# Patient Record
Sex: FEMALE | Race: WHITE | NOT HISPANIC OR LATINO | Employment: OTHER | URBAN - NONMETROPOLITAN AREA
[De-identification: names, ages, dates, MRNs, and addresses within clinical notes are randomized per-mention and may not be internally consistent; named-entity substitution may affect disease eponyms.]

---

## 2018-11-20 ENCOUNTER — HISTORICAL (OUTPATIENT)
Dept: ADMINISTRATIVE | Facility: HOSPITAL | Age: 83
End: 2018-11-20

## 2019-05-14 ENCOUNTER — HISTORICAL (OUTPATIENT)
Dept: ADMINISTRATIVE | Facility: HOSPITAL | Age: 84
End: 2019-05-14

## 2019-08-12 ENCOUNTER — HISTORICAL (OUTPATIENT)
Dept: ADMINISTRATIVE | Facility: HOSPITAL | Age: 84
End: 2019-08-12

## 2021-02-02 LAB
ALBUMIN SERPL-MCNC: 3.7 G/DL (ref 3.4–5)
ALBUMIN/GLOB SERPL: 1.3 {RATIO}
ALP SERPL-CCNC: 87 U/L (ref 50–144)
ALT SERPL-CCNC: 7 U/L (ref 1–45)
ANION GAP SERPL CALC-SCNC: 3 MMOL/L (ref 7–16)
AST SERPL-CCNC: 17 U/L (ref 14–36)
BASOPHILS # BLD AUTO: 0.05 X10(3)/MCL (ref 0.01–0.08)
BASOPHILS NFR BLD AUTO: 0.7 % (ref 0.1–1.2)
BILIRUB SERPL-MCNC: 0.44 MG/DL (ref 0.1–1)
BUN SERPL-MCNC: 21 MG/DL (ref 7–20)
CALCIUM SERPL-MCNC: 9.2 MG/DL (ref 8.4–10.2)
CHLORIDE SERPL-SCNC: 97 MMOL/L (ref 94–110)
CHOLEST SERPL-MCNC: 154 MG/DL (ref 0–200)
CO2 SERPL-SCNC: 40 MMOL/L (ref 21–32)
CREAT SERPL-MCNC: 1.1 MG/DL (ref 0.52–1.04)
CREAT/UREA NIT SERPL: 19.1 (ref 12–20)
DEPRECATED CALCIDIOL+CALCIFEROL SERPL-MC: 28.7 NG/ML (ref 30–100)
EOSINOPHIL # BLD AUTO: 0.3 X10(3)/MCL (ref 0.04–0.36)
EOSINOPHIL NFR BLD AUTO: 4.2 % (ref 0.7–7)
ERYTHROCYTE [DISTWIDTH] IN BLOOD BY AUTOMATED COUNT: 14.9 % (ref 11–14.5)
EST. AVERAGE GLUCOSE BLD GHB EST-MCNC: 123 MG/DL (ref 70–115)
FOLATE SERPL-MCNC: 11 NG/ML (ref 2.76–20)
GLOBULIN SER-MCNC: 2.8 G/DL (ref 2–3.9)
GLUCOSE SERPL-MCNC: 99 MGM./DL (ref 70–115)
HBA1C MFR BLD: 6.1 % (ref 4–6)
HCT VFR BLD AUTO: 49.9 % (ref 36–48)
HDLC SERPL-MCNC: 39 MG/DL (ref 40–60)
HGB BLD-MCNC: 14.8 G/DL (ref 11.8–16)
IMM GRANULOCYTES # BLD AUTO: 0.01 X10E3/UL (ref 0–0.03)
IMM GRANULOCYTES NFR BLD AUTO: 0.1 % (ref 0–0.5)
IRON SERPL-MCNC: 75 MCG/DL (ref 37–170)
LDLC SERPL CALC-MCNC: 88 MG/DL (ref 30–100)
LYMPHOCYTES # BLD AUTO: 1.31 X10(3)/MCL (ref 1.16–3.74)
LYMPHOCYTES NFR BLD AUTO: 18.5 % (ref 20–55)
MCH RBC QN AUTO: 28.6 PG (ref 27–34)
MCHC RBC AUTO-ENTMCNC: 29.7 G/DL (ref 31–37)
MCV RBC AUTO: 96.5 FL (ref 79–99)
MONOCYTES # BLD AUTO: 0.74 X10(3)/MCL (ref 0.24–0.36)
MONOCYTES NFR BLD AUTO: 10.4 % (ref 4.7–12.5)
NEUTROPHILS # BLD AUTO: 4.69 X10(3)/MCL (ref 1.56–6.13)
NEUTROPHILS NFR BLD AUTO: 66.1 % (ref 37–73)
PLATELET # BLD AUTO: 399 X10(3)/MCL (ref 140–371)
PMV BLD AUTO: 11.7 FL (ref 9.4–12.4)
POTASSIUM SERPL-SCNC: 5.2 MMOL/L (ref 3.5–5.1)
PROT SERPL-MCNC: 6.5 G/DL (ref 6.3–8.2)
RBC # BLD AUTO: 5.17 X10(6)/MCL (ref 4–5.1)
SODIUM SERPL-SCNC: 140 MMOL/L (ref 135–145)
TRIGL SERPL-MCNC: 155 MG/DL (ref 30–200)
TSH SERPL-ACNC: 2.19 UIU/ML (ref 0.36–3.74)
VIT B12 SERPL-MCNC: 735 PG/ML (ref 211–946)
WBC # SPEC AUTO: 7.1 X10(3)/MCL (ref 4–11.5)

## 2021-06-10 LAB
BASOPHILS # BLD AUTO: 0.04 X10(3)/MCL (ref 0.01–0.08)
BASOPHILS NFR BLD AUTO: 0.5 % (ref 0.1–1.2)
CHOLEST SERPL-MCNC: 147 MG/DL (ref 0–200)
DEPRECATED CALCIDIOL+CALCIFEROL SERPL-MC: 40.4 NG/ML (ref 30–100)
EOSINOPHIL # BLD AUTO: 0.27 X10(3)/MCL (ref 0.04–0.36)
EOSINOPHIL NFR BLD AUTO: 3.6 % (ref 0.7–7)
ERYTHROCYTE [DISTWIDTH] IN BLOOD BY AUTOMATED COUNT: 15.9 % (ref 11–14.5)
ERYTHROCYTE [SEDIMENTATION RATE] IN BLOOD: 16 MM/HR (ref 0–20)
EST. AVERAGE GLUCOSE BLD GHB EST-MCNC: 132 MG/DL (ref 70–115)
HBA1C MFR BLD: 6.4 % (ref 4–6)
HCT VFR BLD AUTO: 50.3 % (ref 36–48)
HDLC SERPL-MCNC: 42 MG/DL (ref 40–60)
HGB BLD-MCNC: 14.6 G/DL (ref 11.8–16)
IMM GRANULOCYTES # BLD AUTO: 0.02 X10E3/UL (ref 0–0.03)
IMM GRANULOCYTES NFR BLD AUTO: 0.3 % (ref 0–0.5)
LDLC SERPL CALC-MCNC: 73.2 MG/DL (ref 30–100)
LYMPHOCYTES # BLD AUTO: 1.03 X10(3)/MCL (ref 1.16–3.74)
LYMPHOCYTES NFR BLD AUTO: 13.8 % (ref 20–55)
MCH RBC QN AUTO: 27.2 PG (ref 27–34)
MCHC RBC AUTO-ENTMCNC: 29 G/DL (ref 31–37)
MCV RBC AUTO: 93.8 FL (ref 79–99)
MONOCYTES # BLD AUTO: 0.69 X10(3)/MCL (ref 0.24–0.36)
MONOCYTES NFR BLD AUTO: 9.2 % (ref 4.7–12.5)
NEUTROPHILS # BLD AUTO: 5.43 X10(3)/MCL (ref 1.56–6.13)
NEUTROPHILS NFR BLD AUTO: 72.6 % (ref 37–73)
PLATELET # BLD AUTO: 374 X10(3)/MCL (ref 140–371)
PMV BLD AUTO: 11.9 FL (ref 9.4–12.4)
RBC # BLD AUTO: 5.36 X10(6)/MCL (ref 4–5.1)
TRIGL SERPL-MCNC: 138 MG/DL (ref 30–200)
TSH SERPL-ACNC: 7.05 UIU/ML (ref 0.36–3.74)
VIT B12 SERPL-MCNC: 490 PG/ML (ref 211–946)
WBC # SPEC AUTO: 7.5 X10(3)/MCL (ref 4–11.5)

## 2021-09-08 LAB
ALBUMIN SERPL-MCNC: 3.7 G/DL (ref 3.4–5)
ALBUMIN/GLOB SERPL: 1.2 {RATIO}
ALP SERPL-CCNC: 83 U/L (ref 50–144)
ALT SERPL-CCNC: 8 U/L (ref 1–45)
ANION GAP SERPL CALC-SCNC: 6 MMOL/L (ref 7–16)
AST SERPL-CCNC: 19 U/L (ref 14–36)
BASOPHILS # BLD AUTO: 0.07 X10(3)/MCL (ref 0.01–0.08)
BASOPHILS NFR BLD AUTO: 1 % (ref 0.1–1.2)
BILIRUB SERPL-MCNC: 0.38 MG/DL (ref 0.1–1)
BUN SERPL-MCNC: 21 MG/DL (ref 7–20)
CALCIUM SERPL-MCNC: 9.3 MG/DL (ref 8.4–10.2)
CHLORIDE SERPL-SCNC: 98 MMOL/L (ref 94–110)
CHOLEST SERPL-MCNC: 151 MG/DL (ref 0–200)
CO2 SERPL-SCNC: 35 MMOL/L (ref 21–32)
CREAT SERPL-MCNC: 1 MG/DL (ref 0.52–1.04)
CREAT/UREA NIT SERPL: 21 (ref 12–20)
DEPRECATED CALCIDIOL+CALCIFEROL SERPL-MC: 50.3 NG/ML (ref 30–100)
EOSINOPHIL # BLD AUTO: 0.26 X10(3)/MCL (ref 0.04–0.36)
EOSINOPHIL NFR BLD AUTO: 3.7 % (ref 0.7–7)
ERYTHROCYTE [DISTWIDTH] IN BLOOD BY AUTOMATED COUNT: 16.4 % (ref 11–14.5)
EST. AVERAGE GLUCOSE BLD GHB EST-MCNC: 126 MG/DL (ref 70–115)
GLOBULIN SER-MCNC: 3.1 G/DL (ref 2–3.9)
GLUCOSE SERPL-MCNC: 103 MGM./DL (ref 70–115)
HBA1C MFR BLD: 6.2 % (ref 4–6)
HCT VFR BLD AUTO: 47.7 % (ref 36–48)
HDLC SERPL-MCNC: 43 MG/DL (ref 40–60)
HGB BLD-MCNC: 14.2 G/DL (ref 11.8–16)
IMM GRANULOCYTES # BLD AUTO: 0.02 X10E3/UL (ref 0–0.03)
IMM GRANULOCYTES NFR BLD AUTO: 0.3 % (ref 0–0.5)
IRON SERPL-MCNC: 42 MCG/DL (ref 37–170)
LDLC SERPL CALC-MCNC: 72.8 MG/DL (ref 30–100)
LYMPHOCYTES # BLD AUTO: 1.2 X10(3)/MCL (ref 1.16–3.74)
LYMPHOCYTES NFR BLD AUTO: 17 % (ref 20–55)
MCH RBC QN AUTO: 26.6 PG (ref 27–34)
MCHC RBC AUTO-ENTMCNC: 29.8 G/DL (ref 31–37)
MCV RBC AUTO: 89.5 FL (ref 79–99)
MONOCYTES # BLD AUTO: 0.62 X10(3)/MCL (ref 0.24–0.36)
MONOCYTES NFR BLD AUTO: 8.8 % (ref 4.7–12.5)
NEUTROPHILS # BLD AUTO: 4.87 X10(3)/MCL (ref 1.56–6.13)
NEUTROPHILS NFR BLD AUTO: 69.2 % (ref 37–73)
PLATELET # BLD AUTO: 462 X10(3)/MCL (ref 140–371)
PMV BLD AUTO: 11.4 FL (ref 9.4–12.4)
POTASSIUM SERPL-SCNC: 5 MMOL/L (ref 3.5–5.1)
PROT SERPL-MCNC: 6.8 G/DL (ref 6.3–8.2)
RBC # BLD AUTO: 5.33 X10(6)/MCL (ref 4–5.1)
SODIUM SERPL-SCNC: 139 MMOL/L (ref 135–145)
TRIGL SERPL-MCNC: 129 MG/DL (ref 30–200)
TSH SERPL-ACNC: 0.21 UIU/ML (ref 0.36–3.74)
VIT B12 SERPL-MCNC: 465 PG/ML (ref 211–946)
WBC # SPEC AUTO: 7 X10(3)/MCL (ref 4–11.5)

## 2021-12-21 LAB
ANION GAP SERPL CALC-SCNC: 4 MMOL/L (ref 2–13)
BASOPHILS # BLD AUTO: 0.1 10*3/UL (ref 0.01–0.08)
BASOPHILS NFR BLD AUTO: 1.3 % (ref 0.1–1.2)
BUN SERPL-MCNC: 17 MG/DL (ref 7–20)
CALCIUM SERPL-MCNC: 9.1 MG/DL (ref 8.4–10.2)
CHLORIDE SERPL-SCNC: 101 MMOL/L (ref 94–110)
CO2 SERPL-SCNC: 36 MMOL/L (ref 21–32)
CREAT SERPL-MCNC: 0.98 MG/DL (ref 0.52–1.04)
CREAT/UREA NIT SERPL: 17.3 (ref 12–20)
EOSINOPHIL # BLD AUTO: 0.27 10*3/UL (ref 0.04–0.36)
EOSINOPHIL NFR BLD AUTO: 3.5 % (ref 0.7–7)
ERYTHROCYTE [DISTWIDTH] IN BLOOD BY AUTOMATED COUNT: 16.8 % (ref 11–14.5)
GLUCOSE SERPL-MCNC: 90 MGM./DL (ref 70–115)
HCT VFR BLD AUTO: 45.8 % (ref 36–48)
HGB BLD-MCNC: 13.9 G/DL (ref 11.8–16)
IMM GRANULOCYTES # BLD AUTO: 0.01 10*3/UL (ref 0–0.03)
IMM GRANULOCYTES NFR BLD AUTO: 0.1 % (ref 0–0.5)
IRON SERPL-MCNC: 43 MCG/DL (ref 37–170)
LYMPHOCYTES # BLD AUTO: 1.43 10*3/UL (ref 1.16–3.74)
LYMPHOCYTES NFR BLD AUTO: 18.6 % (ref 20–55)
MCH RBC QN AUTO: 26.4 PG (ref 27–34)
MCHC RBC AUTO-ENTMCNC: 30.3 G/DL (ref 31–37)
MCV RBC AUTO: 86.9 FL (ref 79–99)
MONOCYTES # BLD AUTO: 0.76 10*3/UL (ref 0.24–0.36)
MONOCYTES NFR BLD AUTO: 9.9 % (ref 4.7–12.5)
NEUTROPHILS # BLD AUTO: 5.1 10*3/UL (ref 1.56–6.13)
NEUTROPHILS NFR BLD AUTO: 66.6 % (ref 37–73)
PLATELET # BLD AUTO: 518 10*3/UL (ref 140–371)
PMV BLD AUTO: 11.2 FL (ref 9.4–12.4)
POTASSIUM SERPL-SCNC: 5.1 MMOL/L (ref 3.5–5.1)
RBC # BLD AUTO: 5.27 10*6/UL (ref 4–5.1)
SODIUM SERPL-SCNC: 141 MMOL/L (ref 135–145)
TSH SERPL-ACNC: 0.35 UIU/ML (ref 0.36–3.74)
WBC # SPEC AUTO: 7.7 10*3/UL (ref 4–11.5)

## 2022-01-01 ENCOUNTER — OUTSIDE PLACE OF SERVICE (OUTPATIENT)
Dept: PULMONOLOGY | Facility: CLINIC | Age: 87
End: 2022-01-01
Payer: MEDICARE

## 2022-01-01 ENCOUNTER — HISTORICAL (OUTPATIENT)
Dept: ADMINISTRATIVE | Facility: HOSPITAL | Age: 87
End: 2022-01-01

## 2022-01-01 ENCOUNTER — HISTORICAL (OUTPATIENT)
Dept: ADMINISTRATIVE | Facility: HOSPITAL | Age: 87
End: 2022-01-01
Payer: MEDICARE

## 2022-01-01 ENCOUNTER — LAB REQUISITION (OUTPATIENT)
Dept: LAB | Facility: HOSPITAL | Age: 87
End: 2022-01-01
Payer: MEDICARE

## 2022-01-01 VITALS
HEIGHT: 64 IN | SYSTOLIC BLOOD PRESSURE: 114 MMHG | BODY MASS INDEX: 34.31 KG/M2 | WEIGHT: 201 LBS | DIASTOLIC BLOOD PRESSURE: 68 MMHG | OXYGEN SATURATION: 88 %

## 2022-01-01 DIAGNOSIS — R79.89 OTHER SPECIFIED ABNORMAL FINDINGS OF BLOOD CHEMISTRY: ICD-10-CM

## 2022-01-01 DIAGNOSIS — D52.9 FOLATE DEFICIENCY ANEMIA, UNSPECIFIED: ICD-10-CM

## 2022-01-01 DIAGNOSIS — E03.9 HYPOTHYROIDISM, UNSPECIFIED: ICD-10-CM

## 2022-01-01 DIAGNOSIS — R68.89 OTHER GENERAL SYMPTOMS AND SIGNS: ICD-10-CM

## 2022-01-01 DIAGNOSIS — E55.9 VITAMIN D DEFICIENCY, UNSPECIFIED: ICD-10-CM

## 2022-01-01 LAB
AGE: 85
ALBUMIN SERPL-MCNC: 2.2 G/DL (ref 3.4–4.8)
ALBUMIN SERPL-MCNC: 3.8 G/DL (ref 3.4–5)
ALBUMIN/GLOB SERPL: 1 RATIO (ref 1.1–2)
ALBUMIN/GLOB SERPL: 1.5 {RATIO}
ALP SERPL-CCNC: 197 UNIT/L (ref 40–150)
ALP SERPL-CCNC: 76 U/L (ref 50–144)
ALT SERPL-CCNC: 45 UNIT/L (ref 0–55)
ALT SERPL-CCNC: 9 U/L (ref 1–45)
ANION GAP SERPL CALC-SCNC: 3 MMOL/L (ref 2–13)
ANISOCYTOSIS BLD QL SMEAR: ABNORMAL
AST SERPL-CCNC: 21 U/L (ref 14–36)
AST SERPL-CCNC: 58 UNIT/L (ref 5–34)
BASOPHILS # BLD AUTO: 0.05 X10(3)/MCL (ref 0–0.2)
BASOPHILS # BLD AUTO: 0.12 10*3/UL (ref 0.01–0.08)
BASOPHILS NFR BLD AUTO: 0.3 %
BASOPHILS NFR BLD AUTO: 1.3 % (ref 0.1–1.2)
BILIRUB SERPL-MCNC: 0.46 MG/DL (ref 0–1)
BILIRUBIN DIRECT+TOT PNL SERPL-MCNC: 0.6 MG/DL
BNP BLD-MCNC: 1013.8 PG/ML
BUN SERPL-MCNC: 23 MG/DL (ref 7–20)
BUN SERPL-MCNC: 41 MG/DL (ref 9.8–20.1)
CALCIUM SERPL-MCNC: 8.1 MG/DL (ref 8.4–10.2)
CALCIUM SERPL-MCNC: 9.6 MG/DL (ref 8.4–10.2)
CHLORIDE SERPL-SCNC: 102 MMOL/L (ref 94–110)
CHLORIDE SERPL-SCNC: 95 MMOL/L (ref 98–107)
CHOLEST SERPL-MCNC: 114 MG/DL
CHOLEST SERPL-MCNC: 150 MG/DL (ref 0–200)
CHOLEST/HDLC SERPL: 3 {RATIO} (ref 0–5)
CO2 SERPL-SCNC: 33 MMOL/L (ref 23–31)
CO2 SERPL-SCNC: 36 MMOL/L (ref 21–32)
CREAT SERPL-MCNC: 1.09 MG/DL (ref 0.52–1.04)
CREAT SERPL-MCNC: 1.52 MG/DL (ref 0.55–1.02)
CREAT/UREA NIT SERPL: 21.1 (ref 12–20)
DEPRECATED CALCIDIOL+CALCIFEROL SERPL-MC: 30 NG/ML (ref 30–80)
DEPRECATED CALCIDIOL+CALCIFEROL SERPL-MC: 44.9 NG/ML (ref 30–100)
ELLIPTOCYTOSIS (OHS): SLIGHT
EOSINOPHIL # BLD AUTO: 0.12 X10(3)/MCL (ref 0–0.9)
EOSINOPHIL # BLD AUTO: 0.36 10*3/UL (ref 0.04–0.36)
EOSINOPHIL NFR BLD AUTO: 0.6 %
EOSINOPHIL NFR BLD AUTO: 3.9 % (ref 0.7–7)
ERYTHROCYTE [DISTWIDTH] IN BLOOD BY AUTOMATED COUNT: 16.3 % (ref 11–14.5)
ERYTHROCYTE [DISTWIDTH] IN BLOOD BY AUTOMATED COUNT: 30.6 % (ref 11–14.5)
EST. AVERAGE GLUCOSE BLD GHB EST-MCNC: 126 MG/DL (ref 70–115)
EST. AVERAGE GLUCOSE BLD GHB EST-MCNC: 88.2 MG/DL
FOLATE SERPL-MCNC: 8.3 NG/ML (ref 7–31.4)
GFR SERPLBLD CREATININE-BSD FMLA CKD-EPI: 33 MLS/MIN/1.73/M2
GLOBULIN SER-MCNC: 2.3 GM/DL (ref 2.4–3.5)
GLOBULIN SER-MCNC: 2.6 G/DL (ref 2–3.9)
GLUCOSE SERPL-MCNC: 86 MG/DL (ref 70–115)
GLUCOSE SERPL-MCNC: 91 MG/DL (ref 82–115)
HBA1C MFR BLD: 4.7 %
HBA1C MFR BLD: 6.2 % (ref 4–6)
HCT VFR BLD AUTO: 30.8 % (ref 37–47)
HCT VFR BLD AUTO: 46 % (ref 36–48)
HDLC SERPL-MCNC: 44 MG/DL (ref 40–60)
HDLC SERPL-MCNC: 45 MG/DL (ref 35–60)
HGB BLD-MCNC: 14 G/DL (ref 11.8–16)
HGB BLD-MCNC: 8.7 GM/DL (ref 12–16)
HYPOCHROMIA BLD QL SMEAR: SLIGHT
IMM GRANULOCYTES # BLD AUTO: 0.02 10*3/UL (ref 0–0.03)
IMM GRANULOCYTES # BLD AUTO: 0.11 X10(3)/MCL (ref 0–0.04)
IMM GRANULOCYTES NFR BLD AUTO: 0.2 % (ref 0–0.5)
IMM GRANULOCYTES NFR BLD AUTO: 0.6 %
IRON SERPL-MCNC: 38 UG/DL (ref 37–170)
LDLC SERPL CALC-MCNC: 55 MG/DL (ref 50–140)
LDLC SERPL CALC-MCNC: 74.5 MG/DL (ref 30–100)
LYMPHOCYTES # BLD AUTO: 0.56 X10(3)/MCL (ref 0.6–4.6)
LYMPHOCYTES # BLD AUTO: 1.68 10*3/UL (ref 1.16–3.74)
LYMPHOCYTES NFR BLD AUTO: 18.1 % (ref 20–55)
LYMPHOCYTES NFR BLD AUTO: 2.8 %
MANUAL DIFF? (OHS): NORMAL
MCH RBC QN AUTO: 25.4 PG
MCH RBC QN AUTO: 26.4 PG (ref 27–34)
MCHC RBC AUTO-ENTMCNC: 28.2 MG/DL (ref 33–36)
MCHC RBC AUTO-ENTMCNC: 30.4 G/DL (ref 31–37)
MCV RBC AUTO: 86.6 FL (ref 79–99)
MCV RBC AUTO: 90.1 FL (ref 80–94)
MONOCYTES # BLD AUTO: 0.94 10*3/UL (ref 0.24–0.36)
MONOCYTES # BLD AUTO: 1.58 X10(3)/MCL (ref 0.1–1.3)
MONOCYTES NFR BLD AUTO: 10.1 % (ref 4.7–12.5)
MONOCYTES NFR BLD AUTO: 7.9 %
NEUTROPHILS # BLD AUTO: 17.54 X10(3)/MCL (ref 2.1–9.2)
NEUTROPHILS # BLD AUTO: 6.16 10*3/UL (ref 1.56–6.13)
NEUTROPHILS NFR BLD AUTO: 66.4 % (ref 37–73)
NEUTROPHILS NFR BLD AUTO: 87.8 %
PLATELET # BLD AUTO: 469 X10(3)/MCL (ref 140–371)
PLATELET # BLD AUTO: 596 10*3/UL (ref 140–371)
PLATELET # BLD EST: ABNORMAL 10*3/UL
PMV BLD AUTO: 10.9 FL (ref 9.4–12.4)
PMV BLD AUTO: 11.3 FL (ref 9.4–12.4)
POIKILOCYTOSIS BLD QL SMEAR: SLIGHT
POTASSIUM SERPL-SCNC: 4.7 MMOL/L (ref 3.5–5.1)
POTASSIUM SERPL-SCNC: 5.4 MMOL/L (ref 3.5–5.1)
PROT SERPL-MCNC: 4.5 GM/DL (ref 5.8–7.6)
PROT SERPL-MCNC: 6.4 G/DL (ref 6.3–8.2)
RBC # BLD AUTO: 3.42 X10(6)/MCL (ref 4.2–5.4)
RBC # BLD AUTO: 5.31 10*6/UL (ref 4–5.1)
RBC MORPH BLD: ABNORMAL
SODIUM SERPL-SCNC: 136 MMOL/L (ref 136–145)
SODIUM SERPL-SCNC: 141 MMOL/L (ref 135–145)
TRIGL SERPL-MCNC: 119 MG/DL (ref 30–200)
TRIGL SERPL-MCNC: 68 MG/DL (ref 37–140)
TSH SERPL-ACNC: 0.18 M[IU]/L (ref 0.36–3.74)
TSH SERPL-ACNC: 11.05 UIU/ML (ref 0.35–4.94)
VIT B12 SERPL-MCNC: 1035 PG/ML (ref 213–816)
VIT B12 SERPL-MCNC: 432 PG/ML (ref 211–946)
VLDLC SERPL CALC-MCNC: 14 MG/DL
WBC # SPEC AUTO: 20 X10(3)/MCL (ref 4.5–11.5)
WBC # SPEC AUTO: 9.3 10*3/UL (ref 4–11.5)

## 2022-01-01 PROCEDURE — 82607 VITAMIN B-12: CPT | Performed by: INTERNAL MEDICINE

## 2022-01-01 PROCEDURE — 99232 SBSQ HOSP IP/OBS MODERATE 35: CPT | Mod: FS,,, | Performed by: INTERNAL MEDICINE

## 2022-01-01 PROCEDURE — 99232 PR SUBSEQUENT HOSPITAL CARE,LEVL II: ICD-10-PCS | Mod: FS,,, | Performed by: INTERNAL MEDICINE

## 2022-01-01 PROCEDURE — 80053 COMPREHEN METABOLIC PANEL: CPT | Performed by: INTERNAL MEDICINE

## 2022-01-01 PROCEDURE — 80061 LIPID PANEL: CPT | Performed by: INTERNAL MEDICINE

## 2022-01-01 PROCEDURE — 83880 ASSAY OF NATRIURETIC PEPTIDE: CPT | Performed by: INTERNAL MEDICINE

## 2022-01-01 PROCEDURE — 82746 ASSAY OF FOLIC ACID SERUM: CPT | Performed by: INTERNAL MEDICINE

## 2022-01-01 PROCEDURE — 83036 HEMOGLOBIN GLYCOSYLATED A1C: CPT | Performed by: INTERNAL MEDICINE

## 2022-01-01 PROCEDURE — 84443 ASSAY THYROID STIM HORMONE: CPT | Performed by: INTERNAL MEDICINE

## 2022-01-01 PROCEDURE — 82306 VITAMIN D 25 HYDROXY: CPT | Performed by: INTERNAL MEDICINE

## 2022-01-01 PROCEDURE — 85025 COMPLETE CBC W/AUTO DIFF WBC: CPT | Performed by: INTERNAL MEDICINE

## 2023-01-01 ENCOUNTER — HOSPITAL ENCOUNTER (INPATIENT)
Facility: HOSPITAL | Age: 88
LOS: 2 days | Discharge: HOSPICE | DRG: 291 | End: 2023-02-15
Attending: FAMILY MEDICINE | Admitting: FAMILY MEDICINE
Payer: MEDICARE

## 2023-01-01 ENCOUNTER — HOSPITAL ENCOUNTER (INPATIENT)
Facility: HOSPITAL | Age: 88
LOS: 1 days | DRG: 189 | End: 2023-02-17
Attending: FAMILY MEDICINE | Admitting: FAMILY MEDICINE
Payer: MEDICARE

## 2023-01-01 VITALS
DIASTOLIC BLOOD PRESSURE: 45 MMHG | HEIGHT: 64 IN | HEART RATE: 90 BPM | BODY MASS INDEX: 37.81 KG/M2 | TEMPERATURE: 98 F | WEIGHT: 221.5 LBS | SYSTOLIC BLOOD PRESSURE: 84 MMHG | OXYGEN SATURATION: 85 % | RESPIRATION RATE: 22 BRPM

## 2023-01-01 VITALS
TEMPERATURE: 99 F | OXYGEN SATURATION: 81 % | HEART RATE: 61 BPM | HEIGHT: 64 IN | BODY MASS INDEX: 37.56 KG/M2 | WEIGHT: 220 LBS | DIASTOLIC BLOOD PRESSURE: 21 MMHG | SYSTOLIC BLOOD PRESSURE: 46 MMHG

## 2023-01-01 DIAGNOSIS — R06.02 SHORTNESS OF BREATH: ICD-10-CM

## 2023-01-01 DIAGNOSIS — I50.9 CHF (CONGESTIVE HEART FAILURE): ICD-10-CM

## 2023-01-01 DIAGNOSIS — I50.9 ACUTE ON CHRONIC CONGESTIVE HEART FAILURE, UNSPECIFIED HEART FAILURE TYPE: Primary | ICD-10-CM

## 2023-01-01 LAB
ABS NEUT CALC (OHS): 2.87 X10(3)/MCL (ref 2.1–9.2)
ALBUMIN SERPL-MCNC: 2.1 G/DL (ref 3.4–5)
ALBUMIN SERPL-MCNC: 2.3 G/DL (ref 3.4–5)
ALBUMIN SERPL-MCNC: 2.5 G/DL (ref 3.4–5)
ALBUMIN/GLOB SERPL: 0.8 RATIO
ALBUMIN/GLOB SERPL: 0.8 RATIO
ALBUMIN/GLOB SERPL: 0.9 RATIO
ALP SERPL-CCNC: 154 UNIT/L (ref 50–144)
ALP SERPL-CCNC: 163 UNIT/L (ref 50–144)
ALP SERPL-CCNC: 195 UNIT/L (ref 50–144)
ALT SERPL-CCNC: 20 UNIT/L (ref 1–45)
ALT SERPL-CCNC: 28 UNIT/L (ref 1–45)
ALT SERPL-CCNC: 30 UNIT/L (ref 1–45)
ANION GAP SERPL CALC-SCNC: 2 MEQ/L (ref 2–13)
ANION GAP SERPL CALC-SCNC: 4 MEQ/L (ref 2–13)
ANION GAP SERPL CALC-SCNC: 5 MEQ/L (ref 2–13)
ANISOCYTOSIS BLD QL SMEAR: ABNORMAL
AORTIC VALVE CUSP SEPERATION: 1.82 CM
ASCENDING AORTA: 2.06 CM
AST SERPL-CCNC: 40 UNIT/L (ref 14–36)
AST SERPL-CCNC: 58 UNIT/L (ref 14–36)
AST SERPL-CCNC: 63 UNIT/L (ref 14–36)
AV INDEX (PROSTH): 0.51
AV MEAN GRADIENT: 8 MMHG
AV PEAK GRADIENT: 19 MMHG
AV VALVE AREA: 1.15 CM2
AV VELOCITY RATIO: 0.5
BASOPHILS # BLD AUTO: 0.01 X10(3)/MCL (ref 0.01–0.08)
BASOPHILS # BLD AUTO: 0.01 X10(3)/MCL (ref 0.01–0.08)
BASOPHILS NFR BLD AUTO: 0.1 % (ref 0.1–1.2)
BASOPHILS NFR BLD AUTO: 0.2 % (ref 0.1–1.2)
BILIRUBIN DIRECT+TOT PNL SERPL-MCNC: 0.2 MG/DL (ref 0–1)
BILIRUBIN DIRECT+TOT PNL SERPL-MCNC: 0.2 MG/DL (ref 0–1)
BILIRUBIN DIRECT+TOT PNL SERPL-MCNC: 0.3 MG/DL (ref 0–1)
BNP BLD-MCNC: 7720 PG/ML (ref 10–450)
BSA FOR ECHO PROCEDURE: 2.14 M2
BUN SERPL-MCNC: 42 MG/DL (ref 7–20)
BUN SERPL-MCNC: 48 MG/DL (ref 7–20)
BUN SERPL-MCNC: 51 MG/DL (ref 7–20)
CALCIUM SERPL-MCNC: 6.7 MG/DL (ref 8.4–10.2)
CALCIUM SERPL-MCNC: 6.7 MG/DL (ref 8.4–10.2)
CALCIUM SERPL-MCNC: 7.2 MG/DL (ref 8.4–10.2)
CHLORIDE SERPL-SCNC: 101 MMOL/L (ref 98–110)
CHLORIDE SERPL-SCNC: 101 MMOL/L (ref 98–110)
CHLORIDE SERPL-SCNC: 102 MMOL/L (ref 98–110)
CK MB SERPL-MCNC: 0.37 NG/ML (ref 0–3.38)
CK SERPL-CCNC: 34 U/L (ref 30–135)
CO2 SERPL-SCNC: 27 MMOL/L (ref 21–32)
CO2 SERPL-SCNC: 30 MMOL/L (ref 21–32)
CO2 SERPL-SCNC: 31 MMOL/L (ref 21–32)
CREAT SERPL-MCNC: 1.58 MG/DL (ref 0.66–1.25)
CREAT SERPL-MCNC: 1.75 MG/DL (ref 0.66–1.25)
CREAT SERPL-MCNC: 1.78 MG/DL (ref 0.66–1.25)
CREAT/UREA NIT SERPL: 27 (ref 12–20)
CREAT/UREA NIT SERPL: 27 (ref 12–20)
CREAT/UREA NIT SERPL: 29 (ref 12–20)
CV ECHO LV RWT: 0.3 CM
DOP CALC AO PEAK VEL: 2.16 M/S
DOP CALC AO VTI: 52.1 CM
DOP CALC LVOT AREA: 2.2 CM2
DOP CALC LVOT DIAMETER: 1.69 CM
DOP CALC LVOT PEAK VEL: 1.07 M/S
DOP CALC LVOT STROKE VOLUME: 59.86 CM3
DOP CALC MV VTI: 49.6 CM
DOP CALCLVOT PEAK VEL VTI: 26.7 CM
E WAVE DECELERATION TIME: 245 MSEC
E/A RATIO: 0.87
E/E' RATIO: 6.84 M/S
ECHO LV POSTERIOR WALL: 0.8 CM (ref 0.6–1.1)
EJECTION FRACTION: 55 %
ELLIPTOCYTOSIS (OHS): ABNORMAL
EOSINOPHIL # BLD AUTO: 0 X10(3)/MCL (ref 0.04–0.36)
EOSINOPHIL # BLD AUTO: 0.01 X10(3)/MCL (ref 0.04–0.36)
EOSINOPHIL NFR BLD AUTO: 0 % (ref 0.7–7)
EOSINOPHIL NFR BLD AUTO: 0.2 % (ref 0.7–7)
ERYTHROCYTE [DISTWIDTH] IN BLOOD BY AUTOMATED COUNT: 20.1 % (ref 11–14.5)
ERYTHROCYTE [DISTWIDTH] IN BLOOD BY AUTOMATED COUNT: 20.4 % (ref 11–14.5)
ERYTHROCYTE [DISTWIDTH] IN BLOOD BY AUTOMATED COUNT: 20.7 % (ref 11–14.5)
FRACTIONAL SHORTENING: 29 % (ref 28–44)
GFR SERPLBLD CREATININE-BSD FMLA CKD-EPI: 28 MLS/MIN/1.73/M2
GFR SERPLBLD CREATININE-BSD FMLA CKD-EPI: 28 MLS/MIN/1.73/M2
GFR SERPLBLD CREATININE-BSD FMLA CKD-EPI: 32 MLS/MIN/1.73/M2
GLOBULIN SER-MCNC: 2.5 GM/DL (ref 2–3.9)
GLOBULIN SER-MCNC: 2.7 GM/DL (ref 2–3.9)
GLOBULIN SER-MCNC: 3.3 GM/DL (ref 2–3.9)
GLUCOSE SERPL-MCNC: 103 MG/DL (ref 70–115)
GLUCOSE SERPL-MCNC: 156 MG/DL (ref 70–115)
GLUCOSE SERPL-MCNC: 92 MG/DL (ref 70–115)
HCT VFR BLD AUTO: 26.6 % (ref 36–48)
HCT VFR BLD AUTO: 29 % (ref 36–48)
HCT VFR BLD AUTO: 30.6 % (ref 36–48)
HGB BLD-MCNC: 8.6 GM/DL (ref 11.8–16)
HGB BLD-MCNC: 9.5 GM/DL (ref 11.8–16)
HGB BLD-MCNC: 9.6 GM/DL (ref 11.8–16)
IMM GRANULOCYTES # BLD AUTO: 0.04 X10(3)/MCL (ref 0–0.03)
IMM GRANULOCYTES # BLD AUTO: 0.05 X10(3)/MCL (ref 0–0.03)
IMM GRANULOCYTES # BLD AUTO: 0.06 X10(3)/MCL (ref 0–0.03)
IMM GRANULOCYTES NFR BLD AUTO: 0.5 % (ref 0–0.5)
IMM GRANULOCYTES NFR BLD AUTO: 0.8 % (ref 0–0.5)
IMM GRANULOCYTES NFR BLD AUTO: 1.2 % (ref 0–0.5)
INTERVENTRICULAR SEPTUM: 0.8 CM (ref 0.6–1.1)
IVC DIAMETER: 2.86 CM
LEFT ATRIUM SIZE: 4.88 CM
LEFT ATRIUM VOLUME INDEX MOD: 39.3 ML/M2
LEFT ATRIUM VOLUME MOD: 80.5 CM3
LEFT INTERNAL DIMENSION IN SYSTOLE: 3.82 CM (ref 2.1–4)
LEFT VENTRICLE DIASTOLIC VOLUME INDEX: 67.46 ML/M2
LEFT VENTRICLE DIASTOLIC VOLUME: 138.3 ML
LEFT VENTRICLE MASS INDEX: 74 G/M2
LEFT VENTRICLE SYSTOLIC VOLUME INDEX: 30.6 ML/M2
LEFT VENTRICLE SYSTOLIC VOLUME: 62.7 ML
LEFT VENTRICULAR INTERNAL DIMENSION IN DIASTOLE: 5.35 CM (ref 3.5–6)
LEFT VENTRICULAR MASS: 152.5 G
LV LATERAL E/E' RATIO: 7.07 M/S
LV SEPTAL E/E' RATIO: 6.63 M/S
LVOT MG: 2.5 MMHG
LVOT MV: 0.72 CM/S
LYMPHOCYTES # BLD AUTO: 0.71 X10(3)/MCL (ref 1.16–3.74)
LYMPHOCYTES # BLD AUTO: 0.76 X10(3)/MCL (ref 1.16–3.74)
LYMPHOCYTES NFR BLD AUTO: 12.7 % (ref 20–55)
LYMPHOCYTES NFR BLD AUTO: 6.4 % (ref 20–55)
LYMPHOCYTES NFR BLD MANUAL: 0.17 X10(3)/MCL
LYMPHOCYTES NFR BLD MANUAL: 5 % (ref 13–40)
MAGNESIUM SERPL-MCNC: 2 MG/DL (ref 1.8–2.4)
MCH RBC QN AUTO: 29 PG (ref 27–34)
MCH RBC QN AUTO: 29.6 PG (ref 27–34)
MCH RBC QN AUTO: 29.8 PG (ref 27–34)
MCV RBC AUTO: 90.9 FL (ref 79–99)
MCV RBC AUTO: 91.4 FL (ref 79–99)
MCV RBC AUTO: 92.4 FL (ref 79–99)
MEAN CELL HEMOGLOBIN CONCENTRATION (OHS) G/DL: 31.4 G/DL (ref 31–37)
MEAN CELL HEMOGLOBIN CONCENTRATION (OHS) G/DL: 32.3 G/DL (ref 31–37)
MEAN CELL HEMOGLOBIN CONCENTRATION (OHS) G/DL: 32.8 G/DL (ref 31–37)
MONOCYTES # BLD AUTO: 0.65 X10(3)/MCL (ref 0.24–0.36)
MONOCYTES # BLD AUTO: 0.9 X10(3)/MCL (ref 0.24–0.36)
MONOCYTES NFR BLD AUTO: 15 % (ref 4.7–12.5)
MONOCYTES NFR BLD AUTO: 5.9 % (ref 4.7–12.5)
MONOCYTES NFR BLD MANUAL: 0.26 X10(3)/MCL (ref 0.1–1.3)
MONOCYTES NFR BLD MANUAL: 8 % (ref 2–11)
MV MEAN GRADIENT: 3 MMHG
MV PEAK A VEL: 1.22 M/S
MV PEAK E VEL: 1.06 M/S
MV PEAK GRADIENT: 9 MMHG
MV STENOSIS PRESSURE HALF TIME: 93 MS
MV VALVE AREA BY CONTINUITY EQUATION: 1.21 CM2
MV VALVE AREA P 1/2 METHOD: 2.37 CM2
NEUTROPHILS # BLD AUTO: 4.27 X10(3)/MCL (ref 1.56–6.13)
NEUTROPHILS # BLD AUTO: 9.6 X10(3)/MCL (ref 1.56–6.13)
NEUTROPHILS NFR BLD AUTO: 71.1 % (ref 37–73)
NEUTROPHILS NFR BLD AUTO: 87.1 % (ref 37–73)
NEUTROPHILS NFR BLD MANUAL: 87 % (ref 47–80)
NRBC BLD AUTO-RTO: 0 % (ref 0–1)
PISA TR MAX VEL: 2.47 M/S
PLATELET # BLD AUTO: 453 X10(3)/MCL (ref 140–371)
PLATELET # BLD AUTO: 473 X10(3)/MCL (ref 140–371)
PLATELET # BLD AUTO: 634 X10(3)/MCL (ref 140–371)
PLATELET # BLD EST: ABNORMAL 10*3/UL
PMV BLD AUTO: 10.4 FL (ref 9.4–12.4)
PMV BLD AUTO: 10.5 FL (ref 9.4–12.4)
PMV BLD AUTO: 9.6 FL (ref 9.4–12.4)
POIKILOCYTOSIS BLD QL SMEAR: ABNORMAL
POTASSIUM SERPL-SCNC: 4.3 MMOL/L (ref 3.5–5.1)
POTASSIUM SERPL-SCNC: 4.6 MMOL/L (ref 3.5–5.1)
POTASSIUM SERPL-SCNC: 4.6 MMOL/L (ref 3.5–5.1)
PROT SERPL-MCNC: 4.6 GM/DL (ref 6.3–8.2)
PROT SERPL-MCNC: 5 GM/DL (ref 6.3–8.2)
PROT SERPL-MCNC: 5.8 GM/DL (ref 6.3–8.2)
RA PRESSURE: 15 MMHG
RBC # BLD AUTO: 2.91 X10(6)/MCL (ref 4–5.1)
RBC # BLD AUTO: 3.19 X10(6)/MCL (ref 4–5.1)
RBC # BLD AUTO: 3.31 X10(6)/MCL (ref 4–5.1)
RBC MORPH BLD: ABNORMAL
SODIUM SERPL-SCNC: 133 MMOL/L (ref 135–145)
SODIUM SERPL-SCNC: 134 MMOL/L (ref 135–145)
SODIUM SERPL-SCNC: 136 MMOL/L (ref 135–145)
TARGETS BLD QL SMEAR: ABNORMAL
TDI LATERAL: 0.15 M/S
TDI SEPTAL: 0.16 M/S
TDI: 0.16 M/S
TR MAX PG: 24 MMHG
TRICUSPID ANNULAR PLANE SYSTOLIC EXCURSION: 2.78 CM
TROPONIN I SERPL-MCNC: 0.02 NG/ML (ref 0–0.03)
TV REST PULMONARY ARTERY PRESSURE: 39 MMHG
WBC # SPEC AUTO: 11 X10(3)/MCL (ref 4–11.5)
WBC # SPEC AUTO: 3.3 X10(3)/MCL (ref 4–11.5)
WBC # SPEC AUTO: 6 X10(3)/MCL (ref 4–11.5)

## 2023-01-01 PROCEDURE — 94640 AIRWAY INHALATION TREATMENT: CPT

## 2023-01-01 PROCEDURE — 27100171 HC OXYGEN HIGH FLOW UP TO 24 HOURS

## 2023-01-01 PROCEDURE — 63600175 PHARM REV CODE 636 W HCPCS: Performed by: FAMILY MEDICINE

## 2023-01-01 PROCEDURE — 94761 N-INVAS EAR/PLS OXIMETRY MLT: CPT

## 2023-01-01 PROCEDURE — 83880 ASSAY OF NATRIURETIC PEPTIDE: CPT | Performed by: FAMILY MEDICINE

## 2023-01-01 PROCEDURE — 21400001 HC TELEMETRY ROOM

## 2023-01-01 PROCEDURE — 93010 EKG 12-LEAD: ICD-10-PCS | Mod: ,,, | Performed by: INTERNAL MEDICINE

## 2023-01-01 PROCEDURE — 36415 COLL VENOUS BLD VENIPUNCTURE: CPT | Performed by: FAMILY MEDICINE

## 2023-01-01 PROCEDURE — A4216 STERILE WATER/SALINE, 10 ML: HCPCS | Performed by: FAMILY MEDICINE

## 2023-01-01 PROCEDURE — 25000003 PHARM REV CODE 250: Performed by: FAMILY MEDICINE

## 2023-01-01 PROCEDURE — 11000001 HC ACUTE MED/SURG PRIVATE ROOM

## 2023-01-01 PROCEDURE — 83735 ASSAY OF MAGNESIUM: CPT | Performed by: FAMILY MEDICINE

## 2023-01-01 PROCEDURE — 80053 COMPREHEN METABOLIC PANEL: CPT | Performed by: FAMILY MEDICINE

## 2023-01-01 PROCEDURE — 25000242 PHARM REV CODE 250 ALT 637 W/ HCPCS: Performed by: FAMILY MEDICINE

## 2023-01-01 PROCEDURE — 93005 ELECTROCARDIOGRAM TRACING: CPT

## 2023-01-01 PROCEDURE — 94799 UNLISTED PULMONARY SVC/PX: CPT

## 2023-01-01 PROCEDURE — 85025 COMPLETE CBC W/AUTO DIFF WBC: CPT | Performed by: FAMILY MEDICINE

## 2023-01-01 PROCEDURE — 93010 ELECTROCARDIOGRAM REPORT: CPT | Mod: ,,, | Performed by: INTERNAL MEDICINE

## 2023-01-01 PROCEDURE — 99900035 HC TECH TIME PER 15 MIN (STAT)

## 2023-01-01 PROCEDURE — 27000221 HC OXYGEN, UP TO 24 HOURS

## 2023-01-01 PROCEDURE — 84484 ASSAY OF TROPONIN QUANT: CPT | Performed by: FAMILY MEDICINE

## 2023-01-01 PROCEDURE — 96374 THER/PROPH/DIAG INJ IV PUSH: CPT

## 2023-01-01 PROCEDURE — 27000249 HC VAPOTHERM CIRCUIT

## 2023-01-01 PROCEDURE — 85027 COMPLETE CBC AUTOMATED: CPT | Performed by: FAMILY MEDICINE

## 2023-01-01 PROCEDURE — 82553 CREATINE MB FRACTION: CPT | Performed by: FAMILY MEDICINE

## 2023-01-01 PROCEDURE — 82550 ASSAY OF CK (CPK): CPT | Performed by: FAMILY MEDICINE

## 2023-01-01 PROCEDURE — 99285 EMERGENCY DEPT VISIT HI MDM: CPT | Mod: 25

## 2023-01-01 PROCEDURE — 27000200 HC HIGH FLOW DEL DISP CIRCUIT

## 2023-01-01 PROCEDURE — 96375 TX/PRO/DX INJ NEW DRUG ADDON: CPT

## 2023-01-01 RX ORDER — HYDROXYZINE 50 MG/ML
50 INJECTION, SOLUTION INTRAMUSCULAR EVERY 6 HOURS PRN
Status: DISCONTINUED | OUTPATIENT
Start: 2023-01-01 | End: 2023-01-01 | Stop reason: HOSPADM

## 2023-01-01 RX ORDER — FUROSEMIDE 10 MG/ML
40 INJECTION INTRAMUSCULAR; INTRAVENOUS
Status: COMPLETED | OUTPATIENT
Start: 2023-01-01 | End: 2023-01-01

## 2023-01-01 RX ORDER — MORPHINE SULFATE 2 MG/ML
2 INJECTION, SOLUTION INTRAMUSCULAR; INTRAVENOUS EVERY 4 HOURS PRN
Status: DISCONTINUED | OUTPATIENT
Start: 2023-01-01 | End: 2023-02-18 | Stop reason: HOSPADM

## 2023-01-01 RX ORDER — ACETAMINOPHEN 325 MG/1
650 TABLET ORAL EVERY 8 HOURS PRN
Status: DISCONTINUED | OUTPATIENT
Start: 2023-01-01 | End: 2023-02-18 | Stop reason: HOSPADM

## 2023-01-01 RX ORDER — SODIUM CHLORIDE 0.9 % (FLUSH) 0.9 %
10 SYRINGE (ML) INJECTION EVERY 8 HOURS
Status: DISCONTINUED | OUTPATIENT
Start: 2023-01-01 | End: 2023-01-01 | Stop reason: HOSPADM

## 2023-01-01 RX ORDER — ONDANSETRON 2 MG/ML
4 INJECTION INTRAMUSCULAR; INTRAVENOUS ONCE AS NEEDED
Status: DISCONTINUED | OUTPATIENT
Start: 2023-01-01 | End: 2023-01-01

## 2023-01-01 RX ORDER — TRAZODONE HYDROCHLORIDE 50 MG/1
50 TABLET ORAL NIGHTLY
Status: DISCONTINUED | OUTPATIENT
Start: 2023-01-01 | End: 2023-01-01

## 2023-01-01 RX ORDER — BENZONATATE 200 MG/1
200 CAPSULE ORAL 2 TIMES DAILY
COMMUNITY

## 2023-01-01 RX ORDER — TRAZODONE HYDROCHLORIDE 50 MG/1
50 TABLET ORAL NIGHTLY
Status: CANCELLED | OUTPATIENT
Start: 2023-01-01

## 2023-01-01 RX ORDER — HYDROXYZINE 50 MG/ML
50 INJECTION, SOLUTION INTRAMUSCULAR EVERY 6 HOURS PRN
Status: CANCELLED | OUTPATIENT
Start: 2023-01-01

## 2023-01-01 RX ORDER — PANTOPRAZOLE SODIUM 40 MG/1
40 TABLET, DELAYED RELEASE ORAL DAILY
COMMUNITY

## 2023-01-01 RX ORDER — MUPIROCIN 20 MG/G
OINTMENT TOPICAL 2 TIMES DAILY
Status: DISCONTINUED | OUTPATIENT
Start: 2023-01-01 | End: 2023-02-18 | Stop reason: HOSPADM

## 2023-01-01 RX ORDER — CIPROFLOXACIN 250 MG/1
250 TABLET, FILM COATED ORAL 2 TIMES DAILY
COMMUNITY

## 2023-01-01 RX ORDER — ACETAZOLAMIDE 250 MG/1
250 TABLET ORAL 3 TIMES DAILY
COMMUNITY

## 2023-01-01 RX ORDER — MICONAZOLE NITRATE 2 %
POWDER (GRAM) TOPICAL 2 TIMES DAILY
Status: CANCELLED | OUTPATIENT
Start: 2023-01-01

## 2023-01-01 RX ORDER — LEVOTHYROXINE SODIUM 125 UG/1
125 TABLET ORAL
Status: DISCONTINUED | OUTPATIENT
Start: 2023-01-01 | End: 2023-01-01 | Stop reason: HOSPADM

## 2023-01-01 RX ORDER — TRAZODONE HYDROCHLORIDE 50 MG/1
50 TABLET ORAL NIGHTLY
Status: DISCONTINUED | OUTPATIENT
Start: 2023-01-01 | End: 2023-01-01 | Stop reason: HOSPADM

## 2023-01-01 RX ORDER — UMECLIDINIUM BROMIDE AND VILANTEROL TRIFENATATE 62.5; 25 UG/1; UG/1
1 POWDER RESPIRATORY (INHALATION)
COMMUNITY
Start: 2023-01-01

## 2023-01-01 RX ORDER — PANTOPRAZOLE SODIUM 40 MG/1
40 TABLET, DELAYED RELEASE ORAL DAILY
Status: DISCONTINUED | OUTPATIENT
Start: 2023-01-01 | End: 2023-01-01 | Stop reason: HOSPADM

## 2023-01-01 RX ORDER — MAG HYDROX/ALUMINUM HYD/SIMETH 200-200-20
30 SUSPENSION, ORAL (FINAL DOSE FORM) ORAL
Status: CANCELLED | OUTPATIENT
Start: 2023-01-01

## 2023-01-01 RX ORDER — ACETAMINOPHEN 325 MG/1
650 TABLET ORAL EVERY 8 HOURS PRN
Status: CANCELLED | OUTPATIENT
Start: 2023-01-01

## 2023-01-01 RX ORDER — MICONAZOLE NITRATE 2 %
POWDER (GRAM) TOPICAL 2 TIMES DAILY
Status: DISCONTINUED | OUTPATIENT
Start: 2023-01-01 | End: 2023-02-18 | Stop reason: HOSPADM

## 2023-01-01 RX ORDER — SENNOSIDES 8.6 MG/1
187 TABLET ORAL
COMMUNITY
Start: 2022-01-01

## 2023-01-01 RX ORDER — MAG HYDROX/ALUMINUM HYD/SIMETH 200-200-20
30 SUSPENSION, ORAL (FINAL DOSE FORM) ORAL
Status: DISCONTINUED | OUTPATIENT
Start: 2023-01-01 | End: 2023-01-01

## 2023-01-01 RX ORDER — ATORVASTATIN CALCIUM 10 MG/1
10 TABLET, FILM COATED ORAL DAILY
Status: DISCONTINUED | OUTPATIENT
Start: 2023-01-01 | End: 2023-01-01 | Stop reason: HOSPADM

## 2023-01-01 RX ORDER — BUDESONIDE 0.25 MG/2ML
1 INHALANT ORAL EVERY 12 HOURS
Status: DISCONTINUED | OUTPATIENT
Start: 2023-01-01 | End: 2023-01-01 | Stop reason: HOSPADM

## 2023-01-01 RX ORDER — ACETAZOLAMIDE 250 MG/1
250 TABLET ORAL 3 TIMES DAILY
Status: DISCONTINUED | OUTPATIENT
Start: 2023-01-01 | End: 2023-01-01

## 2023-01-01 RX ORDER — SODIUM CHLORIDE 0.9 % (FLUSH) 0.9 %
10 SYRINGE (ML) INJECTION
Status: CANCELLED | OUTPATIENT
Start: 2023-01-01

## 2023-01-01 RX ORDER — FUROSEMIDE 20 MG/1
20 TABLET ORAL DAILY
COMMUNITY

## 2023-01-01 RX ORDER — MUPIROCIN 20 MG/G
OINTMENT TOPICAL 2 TIMES DAILY
Status: CANCELLED | OUTPATIENT
Start: 2023-01-01 | End: 2023-02-19

## 2023-01-01 RX ORDER — ACETAZOLAMIDE 250 MG/1
250 TABLET ORAL 3 TIMES DAILY
Status: CANCELLED | OUTPATIENT
Start: 2023-01-01

## 2023-01-01 RX ORDER — ACETAMINOPHEN 325 MG/1
650 TABLET ORAL EVERY 8 HOURS PRN
Status: DISCONTINUED | OUTPATIENT
Start: 2023-01-01 | End: 2023-01-01 | Stop reason: HOSPADM

## 2023-01-01 RX ORDER — PANTOPRAZOLE SODIUM 40 MG/1
40 TABLET, DELAYED RELEASE ORAL DAILY
Status: CANCELLED | OUTPATIENT
Start: 2023-01-01

## 2023-01-01 RX ORDER — METOPROLOL TARTRATE 25 MG/1
12.5 TABLET ORAL 2 TIMES DAILY
Status: DISCONTINUED | OUTPATIENT
Start: 2023-01-01 | End: 2023-01-01

## 2023-01-01 RX ORDER — ATORVASTATIN CALCIUM 10 MG/1
10 TABLET, FILM COATED ORAL DAILY
Status: CANCELLED | OUTPATIENT
Start: 2023-01-01

## 2023-01-01 RX ORDER — MORPHINE SULFATE 4 MG/ML
4 INJECTION, SOLUTION INTRAMUSCULAR; INTRAVENOUS EVERY 4 HOURS PRN
Status: CANCELLED | OUTPATIENT
Start: 2023-01-01

## 2023-01-01 RX ORDER — METHYLPREDNISOLONE SOD SUCC 125 MG
125 VIAL (EA) INJECTION
Status: COMPLETED | OUTPATIENT
Start: 2023-01-01 | End: 2023-01-01

## 2023-01-01 RX ORDER — SODIUM CHLORIDE 0.9 % (FLUSH) 0.9 %
10 SYRINGE (ML) INJECTION
Status: DISCONTINUED | OUTPATIENT
Start: 2023-01-01 | End: 2023-01-01

## 2023-01-01 RX ORDER — MORPHINE SULFATE 4 MG/ML
4 INJECTION, SOLUTION INTRAMUSCULAR; INTRAVENOUS EVERY 4 HOURS PRN
Status: DISCONTINUED | OUTPATIENT
Start: 2023-01-01 | End: 2023-02-18 | Stop reason: HOSPADM

## 2023-01-01 RX ORDER — SODIUM CHLORIDE 0.9 % (FLUSH) 0.9 %
10 SYRINGE (ML) INJECTION EVERY 8 HOURS
Status: CANCELLED | OUTPATIENT
Start: 2023-01-01

## 2023-01-01 RX ORDER — SODIUM CHLORIDE 0.9 % (FLUSH) 0.9 %
10 SYRINGE (ML) INJECTION EVERY 8 HOURS
Status: DISCONTINUED | OUTPATIENT
Start: 2023-01-01 | End: 2023-01-01

## 2023-01-01 RX ORDER — ALENDRONATE SODIUM 70 MG/1
70 TABLET ORAL
COMMUNITY
Start: 2023-01-01

## 2023-01-01 RX ORDER — METOPROLOL TARTRATE 25 MG/1
12.5 TABLET, FILM COATED ORAL
COMMUNITY
Start: 2022-01-01

## 2023-01-01 RX ORDER — LEVOTHYROXINE SODIUM 125 UG/1
125 TABLET ORAL
Status: DISCONTINUED | OUTPATIENT
Start: 2023-01-01 | End: 2023-01-01

## 2023-01-01 RX ORDER — SUCRALFATE 1 G/10ML
1 SUSPENSION ORAL EVERY 6 HOURS
Status: CANCELLED | OUTPATIENT
Start: 2023-01-01

## 2023-01-01 RX ORDER — MORPHINE SULFATE 2 MG/ML
2 INJECTION, SOLUTION INTRAMUSCULAR; INTRAVENOUS EVERY 4 HOURS PRN
Status: CANCELLED | OUTPATIENT
Start: 2023-01-01

## 2023-01-01 RX ORDER — HYDROXYZINE 50 MG/ML
50 INJECTION, SOLUTION INTRAMUSCULAR EVERY 6 HOURS PRN
Status: DISCONTINUED | OUTPATIENT
Start: 2023-01-01 | End: 2023-02-18 | Stop reason: HOSPADM

## 2023-01-01 RX ORDER — LEVOTHYROXINE SODIUM 125 UG/1
125 TABLET ORAL
COMMUNITY

## 2023-01-01 RX ORDER — SUCRALFATE 1 G/10ML
1 SUSPENSION ORAL EVERY 6 HOURS
Status: DISCONTINUED | OUTPATIENT
Start: 2023-01-01 | End: 2023-01-01

## 2023-01-01 RX ORDER — ZINC SULFATE 50(220)MG
220 CAPSULE ORAL DAILY
COMMUNITY

## 2023-01-01 RX ORDER — BUDESONIDE 0.25 MG/2ML
1 INHALANT ORAL EVERY 12 HOURS
Status: DISCONTINUED | OUTPATIENT
Start: 2023-01-01 | End: 2023-01-01

## 2023-01-01 RX ORDER — MUPIROCIN 20 MG/G
OINTMENT TOPICAL 2 TIMES DAILY
Status: DISCONTINUED | OUTPATIENT
Start: 2023-01-01 | End: 2023-01-01 | Stop reason: HOSPADM

## 2023-01-01 RX ORDER — LOVASTATIN 40 MG/1
40 TABLET ORAL
COMMUNITY
Start: 2023-01-01

## 2023-01-01 RX ORDER — FLUTICASONE PROPIONATE 50 UG/1
POWDER, METERED RESPIRATORY (INHALATION)
COMMUNITY

## 2023-01-01 RX ORDER — MORPHINE SULFATE 2 MG/ML
2 INJECTION, SOLUTION INTRAMUSCULAR; INTRAVENOUS EVERY 4 HOURS PRN
Status: DISCONTINUED | OUTPATIENT
Start: 2023-01-01 | End: 2023-01-01 | Stop reason: HOSPADM

## 2023-01-01 RX ORDER — MICONAZOLE NITRATE 2 %
POWDER (GRAM) TOPICAL 2 TIMES DAILY
Status: DISCONTINUED | OUTPATIENT
Start: 2023-01-01 | End: 2023-01-01 | Stop reason: HOSPADM

## 2023-01-01 RX ORDER — ASCORBIC ACID 500 MG
500 TABLET ORAL DAILY
COMMUNITY

## 2023-01-01 RX ORDER — LEVOTHYROXINE SODIUM 125 UG/1
125 TABLET ORAL
Status: CANCELLED | OUTPATIENT
Start: 2023-01-01

## 2023-01-01 RX ORDER — BUDESONIDE 0.25 MG/2ML
1 INHALANT ORAL EVERY 12 HOURS
Status: CANCELLED | OUTPATIENT
Start: 2023-01-01

## 2023-01-01 RX ORDER — FUROSEMIDE 10 MG/ML
40 INJECTION INTRAMUSCULAR; INTRAVENOUS EVERY 8 HOURS
Status: DISCONTINUED | OUTPATIENT
Start: 2023-01-01 | End: 2023-01-01

## 2023-01-01 RX ORDER — FUROSEMIDE 10 MG/ML
60 INJECTION INTRAMUSCULAR; INTRAVENOUS
Status: COMPLETED | OUTPATIENT
Start: 2023-01-01 | End: 2023-01-01

## 2023-01-01 RX ORDER — PANTOPRAZOLE SODIUM 40 MG/1
40 TABLET, DELAYED RELEASE ORAL DAILY
Status: DISCONTINUED | OUTPATIENT
Start: 2023-01-01 | End: 2023-01-01

## 2023-01-01 RX ORDER — VITAMIN B COMPLEX
1 CAPSULE ORAL DAILY
COMMUNITY

## 2023-01-01 RX ORDER — ONDANSETRON 2 MG/ML
4 INJECTION INTRAMUSCULAR; INTRAVENOUS ONCE AS NEEDED
Status: CANCELLED | OUTPATIENT
Start: 2023-01-01 | End: 2034-07-12

## 2023-01-01 RX ORDER — PREDNISOLONE ACETATE 10 MG/ML
1 SUSPENSION/ DROPS OPHTHALMIC
COMMUNITY

## 2023-01-01 RX ORDER — ARGININE/GLUTAMINE 7.5G-10G
1 PACKET (EA) ORAL 2 TIMES DAILY
COMMUNITY

## 2023-01-01 RX ORDER — ATORVASTATIN CALCIUM 10 MG/1
10 TABLET, FILM COATED ORAL DAILY
Status: DISCONTINUED | OUTPATIENT
Start: 2023-01-01 | End: 2023-02-18 | Stop reason: HOSPADM

## 2023-01-01 RX ORDER — IPRATROPIUM BROMIDE AND ALBUTEROL SULFATE 2.5; .5 MG/3ML; MG/3ML
3 SOLUTION RESPIRATORY (INHALATION)
Status: COMPLETED | OUTPATIENT
Start: 2023-01-01 | End: 2023-01-01

## 2023-01-01 RX ORDER — ACETAZOLAMIDE 250 MG/1
250 TABLET ORAL 3 TIMES DAILY
Status: DISCONTINUED | OUTPATIENT
Start: 2023-01-01 | End: 2023-01-01 | Stop reason: HOSPADM

## 2023-01-01 RX ORDER — TRAZODONE HYDROCHLORIDE 50 MG/1
50 TABLET ORAL NIGHTLY
COMMUNITY

## 2023-01-01 RX ADMIN — FUROSEMIDE 40 MG: 10 INJECTION, SOLUTION INTRAMUSCULAR; INTRAVENOUS at 09:02

## 2023-01-01 RX ADMIN — MORPHINE SULFATE 2 MG: 2 INJECTION, SOLUTION INTRAMUSCULAR; INTRAVENOUS at 01:02

## 2023-01-01 RX ADMIN — MORPHINE SULFATE 1 MG/HR: 10 INJECTION INTRAVENOUS at 02:02

## 2023-01-01 RX ADMIN — SODIUM CHLORIDE, PRESERVATIVE FREE 10 ML: 5 INJECTION INTRAVENOUS at 02:02

## 2023-01-01 RX ADMIN — BUDESONIDE 1 MG: 0.25 SUSPENSION RESPIRATORY (INHALATION) at 08:02

## 2023-01-01 RX ADMIN — HYDROXYZINE HYDROCHLORIDE 50 MG: 50 INJECTION, SOLUTION INTRAMUSCULAR at 09:02

## 2023-01-01 RX ADMIN — IPRATROPIUM BROMIDE AND ALBUTEROL SULFATE 3 ML: .5; 3 SOLUTION RESPIRATORY (INHALATION) at 09:02

## 2023-01-01 RX ADMIN — FUROSEMIDE 40 MG: 10 INJECTION, SOLUTION INTRAMUSCULAR; INTRAVENOUS at 05:02

## 2023-01-01 RX ADMIN — MUPIROCIN: 20 OINTMENT TOPICAL at 09:02

## 2023-01-01 RX ADMIN — LEVOTHYROXINE SODIUM 125 MCG: 0.12 TABLET ORAL at 05:02

## 2023-01-01 RX ADMIN — BUDESONIDE 1 MG: 0.25 INHALANT RESPIRATORY (INHALATION) at 07:02

## 2023-01-01 RX ADMIN — BUDESONIDE 1 MG: 0.25 SUSPENSION RESPIRATORY (INHALATION) at 07:02

## 2023-01-01 RX ADMIN — Medication 10 ML: at 03:02

## 2023-01-01 RX ADMIN — MUPIROCIN: 20 OINTMENT TOPICAL at 10:02

## 2023-01-01 RX ADMIN — Medication 10 ML: at 09:02

## 2023-01-01 RX ADMIN — ACETAZOLAMIDE 250 MG: 250 TABLET ORAL at 02:02

## 2023-01-01 RX ADMIN — HYDROXYZINE HYDROCHLORIDE 50 MG: 50 INJECTION, SOLUTION INTRAMUSCULAR at 12:02

## 2023-01-01 RX ADMIN — PANTOPRAZOLE SODIUM 40 MG: 40 TABLET, DELAYED RELEASE ORAL at 08:02

## 2023-01-01 RX ADMIN — MUPIROCIN: 20 OINTMENT TOPICAL at 08:02

## 2023-01-01 RX ADMIN — MORPHINE SULFATE 2 MG: 2 INJECTION, SOLUTION INTRAMUSCULAR; INTRAVENOUS at 08:02

## 2023-01-01 RX ADMIN — ATORVASTATIN CALCIUM 10 MG: 10 TABLET, FILM COATED ORAL at 08:02

## 2023-01-01 RX ADMIN — ACETAZOLAMIDE 250 MG: 250 TABLET ORAL at 08:02

## 2023-01-01 RX ADMIN — WHITE PETROLATUM: 1.75 OINTMENT TOPICAL at 08:02

## 2023-01-01 RX ADMIN — SODIUM CHLORIDE, PRESERVATIVE FREE 10 ML: 5 INJECTION INTRAVENOUS at 11:02

## 2023-01-01 RX ADMIN — FUROSEMIDE 60 MG: 10 INJECTION, SOLUTION INTRAVENOUS at 10:02

## 2023-01-01 RX ADMIN — MICONAZOLE NITRATE 2 % TOPICAL POWDER: at 10:02

## 2023-01-01 RX ADMIN — MORPHINE SULFATE 2 MG: 2 INJECTION, SOLUTION INTRAMUSCULAR; INTRAVENOUS at 09:02

## 2023-01-01 RX ADMIN — MORPHINE SULFATE 2 MG: 2 INJECTION, SOLUTION INTRAMUSCULAR; INTRAVENOUS at 06:02

## 2023-01-01 RX ADMIN — HYDROXYZINE HYDROCHLORIDE 50 MG: 50 INJECTION, SOLUTION INTRAMUSCULAR at 04:02

## 2023-01-01 RX ADMIN — TRAZODONE HYDROCHLORIDE 50 MG: 50 TABLET ORAL at 09:02

## 2023-01-01 RX ADMIN — HYDROXYZINE HYDROCHLORIDE 50 MG: 50 INJECTION, SOLUTION INTRAMUSCULAR at 07:02

## 2023-01-01 RX ADMIN — METOPROLOL TARTRATE 12.5 MG: 25 TABLET, FILM COATED ORAL at 08:02

## 2023-01-01 RX ADMIN — MORPHINE SULFATE 2 MG: 2 INJECTION, SOLUTION INTRAMUSCULAR; INTRAVENOUS at 03:02

## 2023-01-01 RX ADMIN — MICONAZOLE NITRATE 2 % TOPICAL POWDER: at 08:02

## 2023-01-01 RX ADMIN — METHYLPREDNISOLONE SODIUM SUCCINATE 125 MG: 125 INJECTION, POWDER, FOR SOLUTION INTRAMUSCULAR; INTRAVENOUS at 10:02

## 2023-01-01 RX ADMIN — ACETAZOLAMIDE 250 MG: 250 TABLET ORAL at 09:02

## 2023-01-01 RX ADMIN — SODIUM CHLORIDE, PRESERVATIVE FREE 10 ML: 5 INJECTION INTRAVENOUS at 05:02

## 2023-01-01 RX ADMIN — FUROSEMIDE 40 MG: 10 INJECTION, SOLUTION INTRAMUSCULAR; INTRAVENOUS at 02:02

## 2023-01-01 RX ADMIN — MICONAZOLE NITRATE 2 % TOPICAL POWDER: at 02:02

## 2023-01-01 RX ADMIN — ACETAMINOPHEN 650 MG: 325 TABLET, FILM COATED ORAL at 05:02

## 2023-01-01 RX ADMIN — MICONAZOLE NITRATE 2 % TOPICAL POWDER: at 09:02

## 2023-01-01 RX ADMIN — SODIUM CHLORIDE, PRESERVATIVE FREE 10 ML: 5 INJECTION INTRAVENOUS at 10:02

## 2023-01-01 RX ADMIN — SODIUM CHLORIDE, PRESERVATIVE FREE 10 ML: 5 INJECTION INTRAVENOUS at 06:02

## 2023-01-01 RX ADMIN — WHITE PETROLATUM: 1.75 OINTMENT TOPICAL at 02:02

## 2023-01-01 RX ADMIN — MORPHINE SULFATE 2 MG: 2 INJECTION, SOLUTION INTRAMUSCULAR; INTRAVENOUS at 11:02

## 2023-01-01 RX ADMIN — BUDESONIDE 0.25 MG: 0.25 SUSPENSION RESPIRATORY (INHALATION) at 07:02

## 2023-02-13 NOTE — H&P
Tele-Nocturnist History and Physical  Telemedicine Service was provided using HIPPA compliant web platform using SOC for audio/visual equipment.   Patient Location: [place]   Provider Location: [place], [place]  Participants on call: bedside RN, patient  Physician on call: Dr. Huber  Patient aware of remote access and use of Telemedicine and agrees to continue with care.        History & Physical  Department of Hospital Medicine      SUBJECTIVE:     CC/Reason for Admission to Hospital:   Chief Complaint   Patient presents with    Shortness of Breath     From [facility] c/o sob, nurse reports o2 sat 79% with NRB.  Upon arrival, pt has nonrebreather on , sat of 92%. Denies any pain. Duo neb given enroute per EMS.        History of Present Illness: History obtained from NH records, ER physician, nurse helping in room.  Patient with Dementia    Patient is a 88yo WF resident of NH sent over for SOB/hypoxia.  She has baseline Dementia and is confused to not much information obtained from her.  She apparently has  hx of CHF, COPD, CKD, DMII, HTN, GAURAV, Thyroid disease.  EMS reported a lower sat in the upper 70s during transport.  In ER she is on a NRB and when still maintains sats around 94-95.  She does pull at mask.  She does not appear in any obvious respiratory distress.  No reports of fevers, chills, cough.  N/V or diarrhea.  She is obese and has edema of bilateral lower exts.  On arrival nurses not a MAURICIO drain on left him.  No streaking or evidence of infection. Unclear as to how long drain has been there.  Apparently had a femur fracture surgery at some point.  No documentation from NH.  Looking at previous notes she has had CHF exacerbations in past.        Review of patient's allergies indicates:   Allergen Reactions    Codeine Rash        Past Medical History:   Diagnosis Date    CHF (congestive heart failure)     Chronic kidney disease, unspecified     Cognitive communication deficit     COPD  (chronic obstructive pulmonary disease)     Diabetes mellitus     Hypertension     Hypoxia     Iron deficiency anemia, unspecified     Obstructive sleep apnea     Thyroid disease      Past Surgical History:   Procedure Laterality Date    FEMUR FRACTURE SURGERY Left      History reviewed. No pertinent family history.  Social History     Tobacco Use    Smoking status: Never    Smokeless tobacco: Never   Substance Use Topics    Alcohol use: Not Currently    Drug use: Never         No current facility-administered medications on file prior to encounter.     Current Outpatient Medications on File Prior to Encounter   Medication Sig Dispense Refill    acetaZOLAMIDE (DIAMOX) 250 MG tablet Take 250 mg by mouth 3 (three) times daily.      alendronate (FOSAMAX) 70 MG tablet Take 70 mg by mouth every 7 days.      ANORO ELLIPTA 62.5-25 mcg/actuation DsDv Inhale 1 puff into the lungs.      ascorbic acid, vitamin C, (VITAMIN C) 500 MG tablet Take 500 mg by mouth once daily.      b complex vitamins capsule Take 1 capsule by mouth once daily.      benzonatate (TESSALON) 200 MG capsule Take 200 mg by mouth 2 (two) times a day.      ciprofloxacin HCl (CIPRO) 250 MG tablet Take 250 mg by mouth 2 (two) times daily.      fluticasone propionate (FLOVENT DISKUS) 50 mcg/actuation DsDv Inhale into the lungs. Controller      furosemide (LASIX) 20 MG tablet Take 20 mg by mouth once daily.      levothyroxine (SYNTHROID) 125 MCG tablet Take 125 mcg by mouth before breakfast.      lovastatin (MEVACOR) 40 MG tablet Take 40 mg by mouth.      metoprolol tartrate (LOPRESSOR) 25 MG tablet 12.5 mg.      pantoprazole (PROTONIX) 40 MG tablet Take 40 mg by mouth once daily.      prednisoLONE acetate (PRED FORTE) 1 % DrpS 1 drop.      senna (SENOKOT) 8.6 mg tablet 187 mg.      traZODone (DESYREL) 50 MG tablet Take 50 mg by mouth every evening.      whey-arg-glut-C-Zn-Cu-tos (ARGIMENT AT) 10 gram-7 gram/42.75 gram PwPk Take 1 Package by mouth 2 (two) times  a day.      zinc sulfate (ZINCATE) 50 mg zinc (220 mg) capsule Take 220 mg by mouth once daily.         Review of Systems:  Unable to review due to Dementia       OBJECTIVE:     Vital Signs (Most Recent):  Temp: 97.4 °F (36.3 °C) (02/13/23 0900)  Pulse: 83 (02/13/23 1649)  Resp: (!) 24 (02/13/23 1649)  BP: 105/62 (02/13/23 1649)  SpO2: (!) 90 % (02/13/23 1639)       Physical Exam:  General - Resting comfortably in bed. No apparent distress. Smiling, pulls at mask   HEENT - PERRLA. Extraocular movements intact. No scleral icterus.   Neck -  supple   CV - Regular rate and rhythm, No Murmur, rubs, or gallops.  Resp - Good inspiratory effort. Diminished on R side  GI - Soft. Non-tender to palpation.   Extrem -  No cyanosis, clubbing,+++ edema to bilateral lower exts  Neuro - Will not cooperate with exam but no obvious deficits   PSYC - Awake, pleasantly confused  SKIN - No obvious rash, but she is itching     Data Review:  CBC:   Lab Results   Component Value Date    WBC 6.0 02/13/2023    RBC 3.31 (L) 02/13/2023    HGB 9.6 (L) 02/13/2023    HCT 30.6 (L) 02/13/2023     (H) 02/13/2023     BMP:   Lab Results   Component Value Date     (L) 02/13/2023    K 4.3 02/13/2023    CO2 27 02/13/2023    BUN 42.0 (H) 02/13/2023    CREATININE 1.58 (H) 02/13/2023    CALCIUM 7.2 (L) 02/13/2023         CXR personally reviewed, RLL infiltrate appears fluid        Acute medical issues and MDM for this admission:       Problem: Acute on chronic systolic heart failure  Differential Dx: COPD exacerbation, pleural effusion, PNA  Chronic issues affecting care: Dementia, CHF, confusion   Radiology reports reviewed and/or personal interpretation: CXR personally reviewed, RLL likely fluid   Tests considered or ordered: CT, Echo repeat XR  Plan of care: Will need admission for IV diuresis and monitoring.  She is a DNR.  Will continue to monitor O2 and try to keep mask on her.  Has sandoval now and having some diuresis.     Problem: Acute  respiratory failure   Differential DX: COPD vs CHF exacerbation   Chronic issues affecting care: Dementia, CHF, COPD, pulling at mask   Radiology reports reviewed and/or personal interpretation: CXR personally reviewed as above  Tests considered or ordered: CT XR  Plan of care: Continue O2, make sure she has nebs as well.  Appears more CHF related due to edema.  Has no WBC elevation or fever or cough.      Problem: Acute kidney injury   Differential DX: post obstructive vs intrinsic renal impairment   Chronic issues affecting care: dementia, CHF  Radiology reports reviewed and/or personal interpretation: NA  Tests considered or ordered: labs  Plan of care: Monitor labs, continue IV lasix.  May be related to decreased EF and perfusion.  Monitor next 48hrs to see if any improvement.     Problem: Hyponatremia  Differential DX: SIADH vs dehydration   Chronic issues affecting care: Dementia CHF  Tests considered or ordered: labs  Plan of care: Monitor daily labs, will be on IV lasix    Problem: Dementia  Differential DX: other organic cause   Chronic issues affecting care: age, confusion   Tests considered or ordered: CT  Plan of care: Likely chronic, she is pleasantly confused but this does add to the complication in her care.  She is pulling at mask and could affect O2 delivery.  May need medications to help with compliance.      Problem: Anemia, appears chronic  Differential DX:bleeding from another source   Chronic issues affecting care: Dementia  Tests considered or ordered: further lab study  Plan of care: monitor daily counts           Chronic medical issues addressed during this admission and plan of care:  DMII - will need SSI and monitoring  HTN - continue and adjust home meds  Thyroid disease - review NH med list  GAURAV - not sure if she wears CPAP at night, will need to monitor.   Recent Left femur fracture - not sure of timeline, has MAURICIO drain     Further evaluation and treatment will be contingent on the  response to the above therapy as well as the input from the consultants.  Findings for this admission discussed with patient/patient's family/ER physician.  Total time spent with this admission was 50 mins including discussion with ER physician, Chart review.    Anticipate that the patient will require more than 2 midnights for this hospital stay and the patient will be admitted to Inpatient status.    Code Status: DNR per NH records

## 2023-02-13 NOTE — ED PROVIDER NOTES
Encounter Date: 2/13/2023       History     Chief Complaint   Patient presents with    Shortness of Breath     From [facility] c/o sob, nurse reports o2 sat 79% with NRB.  Upon arrival, pt has nonrebreather on , sat of 92%. Denies any pain. Duo neb given enroute per EMS.      Patient sent over by the nursing home for shortness of breath.  She was found to have sats in the 70s with some labored breathing placed on oxygen and transported to the emergency room.  Patient is demented and a very poor historian says she feels fine has no complaints.    The history is provided by the patient and the nursing home.   Review of patient's allergies indicates:   Allergen Reactions    Codeine Rash     Past Medical History:   Diagnosis Date    CHF (congestive heart failure)     Chronic kidney disease, unspecified     Cognitive communication deficit     COPD (chronic obstructive pulmonary disease)     Diabetes mellitus     Hypertension     Hypoxia     Iron deficiency anemia, unspecified     Obstructive sleep apnea     Thyroid disease      Past Surgical History:   Procedure Laterality Date    FEMUR FRACTURE SURGERY Left      History reviewed. No pertinent family history.  Social History     Tobacco Use    Smoking status: Never    Smokeless tobacco: Never   Substance Use Topics    Alcohol use: Not Currently    Drug use: Never     Review of Systems   HENT:  Negative for sore throat.    Respiratory:  Positive for shortness of breath. Negative for cough.    Cardiovascular:  Negative for chest pain.   Gastrointestinal:  Negative for nausea.   Genitourinary:  Negative for dysuria.   Musculoskeletal:  Negative for back pain.   Skin:  Negative for rash.   Neurological:  Negative for weakness.   Hematological:  Does not bruise/bleed easily.   All other systems reviewed and are negative.    Physical Exam     Initial Vitals [02/13/23 0900]   BP Pulse Resp Temp SpO2   (!) 113/90 90 (!) 28 97.4 °F (36.3 °C) (!) 92 %      MAP       --          Physical Exam    ED Course   Procedures  Labs Reviewed   COMPREHENSIVE METABOLIC PANEL - Abnormal; Notable for the following components:       Result Value    Sodium Level 133 (*)     Blood Urea Nitrogen 42.0 (*)     Creatinine 1.58 (*)     Calcium Level Total 7.2 (*)     Protein Total 5.8 (*)     Albumin Level 2.5 (*)     Alkaline Phosphatase 195 (*)     Aspartate Aminotransferase 58 (*)     BUN/Creatinine Ratio 27 (*)     All other components within normal limits   CBC WITH DIFFERENTIAL - Abnormal; Notable for the following components:    RBC 3.31 (*)     Hgb 9.6 (*)     Hct 30.6 (*)     RDW 20.7 (*)     Platelet 473 (*)     Lymph % 12.7 (*)     Mono % 15.0 (*)     Eos % 0.2 (*)     Lymph # 0.76 (*)     Mono # 0.90 (*)     Eos # 0.01 (*)     IG# 0.05 (*)     IG% 0.8 (*)     All other components within normal limits   NT-PRO NATRIURETIC PEPTIDE - Abnormal; Notable for the following components:    ProBNP 7,720 (*)     All other components within normal limits   TROPONIN I - Normal   CK - Normal   CK-MB - Normal   CBC W/ AUTO DIFFERENTIAL    Narrative:     The following orders were created for panel order CBC Auto Differential.  Procedure                               Abnormality         Status                     ---------                               -----------         ------                     CBC with Differential[[id number]]        Abnormal            Final result                 Please view results for these tests on the individual orders.     EKG Readings: (Independently Interpreted)   Initial Reading: No STEMI. Rhythm: Normal Sinus Rhythm. Ectopy: No Ectopy. Conduction: Normal. ST Segments: Normal ST Segments. T Waves: Normal. Clinical Impression: Normal Sinus Rhythm   ECG Results              EKG 12-lead (Final result)  Result time 02/13/23 18:02:27      Final result by Interface, Lab In [facility] (02/13/23 18:02:27)                   Narrative:    Test Reason : R06.02,    Vent. Rate : 097 BPM      Atrial Rate : 097 BPM     P-R Int : 126 ms          QRS Dur : 116 ms      QT Int : 362 ms       P-R-T Axes : 031 039 042 degrees     QTc Int : 459 ms    Baseline Artifact  Sinus rhythm with Premature supraventricular complexes  Right bundle branch block  Abnormal ECG  No previous ECGs available  Confirmed by Chad MIRELES, Wilmar ([id number]) on 2/13/2023 6:02:20 PM    Referred By:             Confirmed By:Wilmar Bonilla MD                                  Imaging Results              X-Ray Chest 1 View (Final result)  Result time 02/13/23 11:04:44      Final result by Papo Hahn III, MD (02/13/23 11:04:44)                   Impression:      1. The heart is moderately enlarged with vascular congestion and fairly prominent changes of pulmonary edema with small bilateral pleural effusions noted layering dependently.  See above comments.      Electronically signed by: Papo Hahn  Date:    02/13/2023  Time:    11:04               Narrative:    EXAMINATION:  STUDY: XR CHEST 1 VIEW    CLINICAL HISTORY AND TECHNIQUE:  RT Shaka on 2/13/2023 10:01 AM    CURRENT CLINICAL HISTORY: ER PT    X 1 DAY    -SOB, COUGH, CONGESTION, FLUID OVERLOAD    PAST MEDICAL HISTORY: CV-, SMOKER, COPD, EMPHYSEMA, DIABETES    TECHNIQUE: 1 VIEW CHEST PORTABLE    TECHNOLOGIST: NORRIS    COMPARISON:  12/05/2022    FINDINGS:  The heart is at least moderately enlarged with vascular congestion and fairly prominent changes of pulmonary edema.Dense clouding and consolidation are noted within the right mid lower lung field and to a lesser extent the left lower lung field with small, bilateral pleural effusions noted layering dependently. There is moderate demineralization of the skeletal structures with mild to moderate degenerative changes noted involving the thoracic spine.                                    X-Rays:   Independently Interpreted Readings:   Chest X-Ray: CHF noted   Medications   acetaZOLAMIDE tablet 250 mg (250 mg Oral Not Given 2/13/23 2100)    budesonide nebulizer solution 1 mg (1 mg Nebulization Given 2/13/23 2016)   levothyroxine tablet 125 mcg (125 mcg Oral Given 2/14/23 0507)   atorvastatin tablet 10 mg (has no administration in time range)   metoprolol tartrate (LOPRESSOR) split tablet 12.5 mg (12.5 mg Oral Not Given 2/13/23 2100)   pantoprazole EC tablet 40 mg (has no administration in time range)   traZODone tablet 50 mg (50 mg Oral Given 2/13/23 2145)   sodium chloride 0.9% flush 10 mL (10 mLs Intravenous Given 2/14/23 0600)   acetaminophen tablet 650 mg (has no administration in time range)   ondansetron injection 4 mg (has no administration in time range)   furosemide injection 40 mg (40 mg Intravenous Given 2/14/23 0505)   albuterol-ipratropium 2.5 mg-0.5 mg/3 mL nebulizer solution 3 mL (3 mLs Nebulization Given 2/13/23 0932)   methylPREDNISolone sodium succinate injection 125 mg (125 mg Intravenous Given 2/13/23 1035)   furosemide injection 60 mg (60 mg Intravenous Given 2/13/23 1035)   furosemide injection 40 mg (40 mg Intravenous Given 2/13/23 1704)     Medical Decision Making:   Initial Assessment:   Sob, cough  Differential Diagnosis:   CHF, pneumonia, bronchitis  Clinical Tests:   Lab Tests: Ordered and Reviewed  Radiological Study: Ordered and Reviewed  Medical Tests: Ordered and Reviewed  ED Management:  Diuresis ok, will add more lasix and admit.                        Clinical Impression:   Final diagnoses:  [R06.02] Shortness of breath  [I50.9] Acute on chronic congestive heart failure, unspecified heart failure type (Primary)        ED Disposition Condition    Admit Stable                Don Byrne MD  02/14/23 6675

## 2023-02-14 PROBLEM — J96.01 ACUTE HYPOXEMIC RESPIRATORY FAILURE: Status: ACTIVE | Noted: 2023-01-01

## 2023-02-14 PROBLEM — I50.23 ACUTE ON CHRONIC SYSTOLIC CHF (CONGESTIVE HEART FAILURE): Status: ACTIVE | Noted: 2023-01-01

## 2023-02-14 NOTE — HPI
Patient is a 88yo WF resident of NH sent over for SOB/hypoxia.  She has baseline Dementia and is confused to not much information obtained from her.  She apparently has  hx of CHF, COPD, CKD, DMII, HTN, GAURAV, Thyroid disease.  EMS reported a lower sat in the upper 70s during transport.  In ER she is on a NRB and when still maintains sats around 94-95.  She does pull at mask.  She does not appear in any obvious respiratory distress.  No reports of fevers, chills, cough.  N/V or diarrhea.  She is obese and has edema of bilateral lower exts.  On arrival nurses not a MAURICIO drain on left him.  No streaking or evidence of infection. Unclear as to how long drain has been there.  Apparently had a femur fracture surgery at some point.  No documentation from NH.  Looking at previous notes she has had CHF exacerbations in past.

## 2023-02-14 NOTE — PLAN OF CARE
Problem: Adult Inpatient Plan of Care  Goal: Plan of Care Review  Flowsheets (Taken 2/13/2023 2100)  Plan of Care Reviewed With: patient     Problem: Skin Injury Risk Increased  Goal: Skin Health and Integrity  Intervention: Optimize Skin Protection  Flowsheets (Taken 2/14/2023 0156)  Pressure Reduction Devices: foam padding utilized  Skin Protection: incontinence pads utilized  Head of Bed (HOB) Positioning: HOB at 30 degrees

## 2023-02-14 NOTE — ASSESSMENT & PLAN NOTE
Patient is identified as having Systolic (HFrEF) heart failure that is Acute on chronic. CHF is currently uncontrolled due to Continued edema of extremities. Latest ECHO performed and demonstrates- Results for orders placed during the hospital encounter of 02/13/23    Echo    Interpretation Summary  Normal LV function-EF 55%  Indeterminant diastolic function  Mild to moderate tricuspid regurgitation  RVSP 40 mm Hg  IVC dilated  Pleural effusion  . Continue Beta Blocker and monitor clinical status closely. Monitor on telemetry. Patient is on CHF pathway.  Monitor strict Is&Os and daily weights.  Place on fluid restriction of 1.5 L. Continue to stress to patient importance of self efficacy and  on diet for CHF. Last BNP reviewed- and noted below No results for input(s): BNP, BNPTRIAGEBLO in the last 168 hours..    consult cardio cont in [facility]

## 2023-02-14 NOTE — PLAN OF CARE
02/14/23 0956   Discharge Assessment   Assessment Type Discharge Planning Assessment   Confirmed/corrected address, phone number and insurance Yes   Confirmed Demographics Correct on Facesheet   Source of Information health record   When was your last doctors appointment?   (unsure)   Communicated SAMREEN with patient/caregiver Date not available/Unable to determine   Reason For Admission CHF   People in Home facility resident   Facility Arrived From: [facility]   Do you expect to return to your current living situation? Yes   Do you have help at home or someone to help you manage your care at home? Yes   Who are your caregiver(s) and their phone number(s)? [facility]   Prior to hospitilization cognitive status: Unable to Assess   Current cognitive status: Unable to Assess   Walking or Climbing Stairs ambulation difficulty, requires equipment;ambulation difficulty, assistance 1 person;transferring difficulty, requires equipment;transferring difficulty, assistance 1 person   Mobility Management NH staff, WC, walker   Dressing/Bathing bathing difficulty, assistance 1 person;dressing difficulty, requires equipment;bathing difficulty, requires equipment   Dressing/Bathing Management NH staff   Home Accessibility wheelchair accessible   Equipment Currently Used at Home wheelchair;oxygen;rollator   Readmission within 30 days? No   Patient currently being followed by outpatient case management? No   Do you currently have service(s) that help you manage your care at home? Yes   Name and Contact number of agency [facility]   Is the pt/caregiver preference to resume services with current agency Yes   Do you take prescription medications? Yes   Do you have prescription coverage? Yes   Coverage MCR   Do you have any problems affording any of your prescribed medications? No   Is the patient taking medications as prescribed? yes   Who is going to help you get home at discharge? NHstaff   How do you get to  doctors appointments? other (see comments)   Are you on dialysis? No   Do you take coumadin? No   Discharge Plan A Return to nursing home   Discharge Plan B Return to Nursing Home   DME Needed Upon Discharge  none   Discharge Plan discussed with:   (NH staff)   Discharge Barriers Identified None   Physical Activity   On average, how many days per week do you engage in moderate to strenuous exercise (like a brisk walk)? 5 days   On average, how many minutes do you engage in exercise at this level? 20 min   Financial Resource Strain   How hard is it for you to pay for the very basics like food, housing, medical care, and heating? Not hard   Housing Stability   In the last 12 months, was there a time when you were not able to pay the mortgage or rent on time? Y   In the last 12 months, how many places have you lived? 1   In the last 12 months, was there a time when you did not have a steady place to sleep or slept in a shelter (including now)? N   Transportation Needs   In the past 12 months, has lack of transportation kept you from medical appointments or from getting medications? no   In the past 12 months, has lack of transportation kept you from meetings, work, or from getting things needed for daily living? No   Food Insecurity   Within the past 12 months, you worried that your food would run out before you got the money to buy more. Never true   Within the past 12 months, the food you bought just didn't last and you didn't have money to get more. Never true   Stress   Do you feel stress - tense, restless, nervous, or anxious, or unable to sleep at night because your mind is troubled all the time - these days? Not at all   Social Connections   In a typical week, how many times do you talk on the phone with family, friends, or neighbors?   (unable to answer)   How often do you attend Buddhist or Faith services?   (unable to answer)   Alcohol Use   Q1: How often do you have a drink containing alcohol? Never   Q2:  How many drinks containing alcohol do you have on a typical day when you are drinking? None   Q3: How often do you have six or more drinks on one occasion? Never   OTHER   Name(s) of People in Home [facility]

## 2023-02-14 NOTE — PROGRESS NOTES
[facility]-Med/Surg  Wound Care    Patient Name:  Shakira Shaw   MRN:  [id number]  Date: 2/14/2023  Diagnosis: <principal problem not specified>    History:     Past Medical History:   Diagnosis Date    CHF (congestive heart failure)     Chronic kidney disease, unspecified     Cognitive communication deficit     COPD (chronic obstructive pulmonary disease)     Diabetes mellitus     Hypertension     Hypoxia     Iron deficiency anemia, unspecified     Obstructive sleep apnea     Thyroid disease        Social History     Socioeconomic History    Marital status:    Tobacco Use    Smoking status: Never    Smokeless tobacco: Never   Substance and Sexual Activity    Alcohol use: Not Currently    Drug use: Never    Sexual activity: Not Currently     Social Determinants of Health     Financial Resource Strain: Low Risk     Difficulty of Paying Living Expenses: Not hard at all   Food Insecurity: No Food Insecurity    Worried About Running Out of Food in the Last Year: Never true    Ran Out of Food in the Last Year: Never true   Transportation Needs: No Transportation Needs    Lack of Transportation (Medical): No    Lack of Transportation (Non-Medical): No   Physical Activity: Insufficiently Active    Days of Exercise per Week: 5 days    Minutes of Exercise per Session: 20 min   Stress: No Stress Concern Present    Feeling of Stress : Not at all   Social Connections: Unknown    Frequency of Communication with Friends and Family: More than three times a week    Frequency of Social Gatherings with Friends and Family: More than three times a week    Attends Restoration Services: Never    Active Member of Clubs or Organizations: No    Attends Club or Organization Meetings: Never   Housing Stability: High Risk    Unable to Pay for Housing in the Last Year: Yes    Number of Places Lived in the Last Year: 1    Unstable Housing in the Last Year: No       Precautions:     Allergies as of 02/13/2023 - Reviewed  02/13/2023   Allergen Reaction Noted    Codeine Rash 12/06/2022       WOC Assessment Details/Treatment            02/14/23 1123   WOCN Assessment   WOCN Total Time (mins) 40   Visit Date 02/14/23   Visit Time 1045   Consult Type New   WOCN Speciality Wound   Wound pressure;surgical   Intervention assessed;changed;applied   Skin Interventions   Pressure Reduction Devices pressure-redistributing mattress utilized   Pressure Reduction Techniques frequent weight shift encouraged;heels elevated off bed;positioned off wounds   Positioning   Body Position side-lying   Head of Bed (HOB) Positioning HOB at 20-30 degrees   Positioning/Transfer Devices pillows;wedge   Pressure Injury Prevention    Heel protection technique Pillow        Altered Skin Integrity Left distal;lateral Greater trochanter #1 Other (comment)    No Date First Assessed or Time First Assessed found.   Side: Left  Orientation: distal;lateral  Location: Greater trochanter  Wound Number: #1  Primary Wound Type: (c) Other (comment)  Description of Altered Skin Integrity: (c)    Wound Image     Dressing Appearance Moist drainage   Drainage Amount Moderate   Drainage Characteristics/Odor Serous   Appearance Moist;Red   Tissue loss description Full thickness   Red (%), Wound Tissue Color 100 %   Periwound Area Redness;Swelling   Wound Edges Defined   Wound Length (cm) 2.5 cm   Wound Width (cm) 1.4 cm   Wound Depth (cm) 5 cm   Wound Volume (cm^3) 17.5 cm^3   Wound Surface Area (cm^2) 3.5 cm^2   Care Cleansed with:;Sterile normal saline   Dressing Applied;Gauze, wet to moist;Island/border   Dressing Change Due 02/15/23        Altered Skin Integrity Left lateral;proximal Greater trochanter #2 Other (comment)    No Date First Assessed or Time First Assessed found.   Side: Left  Orientation: lateral;proximal  Location: Greater trochanter  Wound Number: #2  Primary Wound Type: (c) Other (comment)  Description of Altered Skin Integrity: (c)    Wound Image     Dressing  Appearance Moist drainage   Drainage Amount Moderate   Drainage Characteristics/Odor Serous   Appearance Yellow;Red;Moist   Tissue loss description Full thickness   Red (%), Wound Tissue Color 95 %   Yellow (%), Wound Tissue Color 5 %   Periwound Area Swelling;Redness   Wound Edges Defined   Wound Length (cm) 3.5 cm   Wound Width (cm) 0.9 cm   Wound Depth (cm) 3 cm   Wound Volume (cm^3) 9.45 cm^3   Wound Surface Area (cm^2) 3.15 cm^2   Undermining (depth (cm)/location) 1.7 cm  at 6 o'clock, 2.1 cm at 7-8 o'clock, 1 cm at 9 o'clock, 0.8 cm from 10-11 o'clock   Care Cleansed with:;Sterile normal saline   Dressing Applied;Gauze, wet to moist;Island/border   Dressing Change Due 02/15/23        Altered Skin Integrity 02/13/23 1945 Coccyx #3 Other (comment) Full thickness tissue loss. Base is covered by slough and/or eschar in the wound bed   Date First Assessed/Time First Assessed: 02/13/23 1945   Altered Skin Integrity Present on Admission: yes  Location: Coccyx  Wound Number: #3  Is this injury device related?: No  Primary Wound Type: (c) Other (comment)  Description of Altered Skin Int...   Wound Image     Description of Altered Skin Integrity Full thickness tissue loss. Base is covered by slough and/or eschar in the wound bed   Dressing Appearance Dry;Intact;Clean   Drainage Amount None   Appearance Red;Yellow;Moist   Black (%), Wound Tissue Color 0 %   Wound Length (cm) 1.5 cm   Wound Width (cm) 1.7 cm   Wound Depth (cm) 0.1 cm   Wound Volume (cm^3) 0.255 cm^3   Wound Surface Area (cm^2) 2.55 cm^2   Dressing   (peeled back and reapplied.)   Dressing Change Due 02/18/23        Drain/Device  12/09/22 0800 Left hip other (see comments)   Placement Date/Time: 12/09/22 0800   Present Prior to Hospital Arrival?: Yes  Side: Left  Location: hip  Type: (c) other (see comments)   Insertion Site dressing changed  (cleansed with NS covered with gauze and medipore)   Drainage Characteristics/Odor No odor;Serous   Drainage Amount  Small   Orders placed.     02/14/2023

## 2023-02-14 NOTE — CARE UPDATE
UNABLE TO MAINTAIN  O2 SAT ABOVE 88%. PLACED PT ON OXYMIZER BUT O2 SAT DID NOT REACH ABOVE 88%. SWITCHED TO AIRVO @ 60 LPM % OXYGEN. O2 SAT INCREASED TO 92%. TOLERATING WELL.

## 2023-02-14 NOTE — PLAN OF CARE
Problem: Infection  Goal: Absence of Infection Signs and Symptoms  Outcome: Ongoing, Progressing     Problem: Adult Inpatient Plan of Care  Goal: Plan of Care Review  2/14/2023 0202 by Michelle Hanna RN  Outcome: Ongoing, Progressing  2/14/2023 0156 by Michelle Hanna RN  Flowsheets (Taken 2/13/2023 2100)  Plan of Care Reviewed With: patient  Goal: Patient-Specific Goal (Individualized)  Outcome: Ongoing, Progressing  Goal: Absence of Hospital-Acquired Illness or Injury  Outcome: Ongoing, Progressing  Goal: Optimal Comfort and Wellbeing  Outcome: Ongoing, Progressing  Goal: Readiness for Transition of Care  Outcome: Ongoing, Progressing     Problem: Impaired Wound Healing  Goal: Optimal Wound Healing  Outcome: Ongoing, Progressing     Problem: Skin Injury Risk Increased  Goal: Skin Health and Integrity  Outcome: Ongoing, Progressing  Intervention: Optimize Skin Protection  Flowsheets (Taken 2/14/2023 0156)  Pressure Reduction Devices: foam padding utilized  Skin Protection: incontinence pads utilized  Head of Bed (HOB) Positioning: HOB at 30 degrees     Problem: Fall Injury Risk  Goal: Absence of Fall and Fall-Related Injury  Outcome: Ongoing, Progressing

## 2023-02-14 NOTE — PROGRESS NOTES
[facility]/Surg  [facility] Medicine  Progress Note    Patient Name: Shakira Shaw  MRN: [id number]  Patient Class: IP- Inpatient   Admission Date: 2/13/2023  Length of Stay: 1 days  Attending Physician: Juice Rodriguez MD  Primary Care Provider: Rafi Baker MD        Subjective:     Principal Problem:<principal problem not specified>        HPI:  Patient is a 88yo WF resident of NH sent over for SOB/hypoxia.  She has baseline Dementia and is confused to not much information obtained from her.  She apparently has  hx of CHF, COPD, CKD, DMII, HTN, GAURAV, Thyroid disease.  EMS reported a lower sat in the upper 70s during transport.  In ER she is on a NRB and when still maintains sats around 94-95.  She does pull at mask.  She does not appear in any obvious respiratory distress.  No reports of fevers, chills, cough.  N/V or diarrhea.  She is obese and has edema of bilateral lower exts.  On arrival nurses not a MAURICIO drain on left him.  No streaking or evidence of infection. Unclear as to how long drain has been there.  Apparently had a femur fracture surgery at some point.  No documentation from NH.  Looking at previous notes she has had CHF exacerbations in past.        Overview/Hospital Course:  02/14/2023  Increased oxygen support overnight on airvo 50% 40 L O2 sats low 90          Review of Systems   Constitutional:  Negative for chills, diaphoresis, fatigue and fever.   HENT:  Negative for congestion, hearing loss, sore throat and tinnitus.    Eyes:  Negative for photophobia.   Respiratory:  Negative for cough, chest tightness and shortness of breath.    Cardiovascular:  Negative for chest pain and palpitations.   Gastrointestinal:  Negative for abdominal pain, blood in stool, constipation, diarrhea and nausea.   Endocrine: Negative for cold intolerance and heat intolerance.   Genitourinary:  Negative for dysuria, frequency and hematuria.   Neurological:  Negative for seizures, weakness,  light-headedness and headaches.   Psychiatric/Behavioral:  Negative for hallucinations and suicidal ideas. The patient is not nervous/anxious.    Objective:     Vital Signs (Most Recent):  Temp: 96.6 °F (35.9 °C) (02/14/23 1101)  Pulse: 65 (02/14/23 1145)  Resp: (!) 24 (02/14/23 1145)  BP: (!) 124/48 (02/14/23 1101)  SpO2: (!) 93 % (02/14/23 1145)   Vital Signs (24h Range):  Temp:  [96.6 °F (35.9 °C)-97.8 °F (36.6 °C)] 96.6 °F (35.9 °C)  Pulse:  [8-96] 65  Resp:  [18-26] 24  SpO2:  [90 %-96 %] 93 %  BP: (105-128)/(40-63) 124/48     Weight: 101.2 kg (223 lb)  Body mass index is 38.28 kg/m².    Intake/Output Summary (Last 24 hours) at 2/14/2023 1416  Last data filed at 2/14/2023 0830  Gross per 24 hour   Intake 120 ml   Output 1210 ml   Net -1090 ml      Physical Exam  Vitals and nursing note reviewed.   Constitutional:       General: She is not in acute distress.     Appearance: Normal appearance. She is normal weight. She is not ill-appearing.   HENT:      Head: Normocephalic and atraumatic.      Right Ear: Tympanic membrane normal.      Left Ear: Tympanic membrane normal.      Nose: Nose normal.      Mouth/Throat:      Mouth: Mucous membranes are moist.      Pharynx: Oropharynx is clear.   Eyes:      Extraocular Movements: Extraocular movements intact.      Pupils: Pupils are equal, round, and reactive to light.   Cardiovascular:      Rate and Rhythm: Normal rate and regular rhythm.      Pulses: Normal pulses.      Heart sounds: Normal heart sounds.   Pulmonary:      Effort: Pulmonary effort is normal.      Breath sounds: Normal breath sounds.   Abdominal:      General: Abdomen is flat. Bowel sounds are normal.      Palpations: Abdomen is soft.   Genitourinary:     Rectum: Normal.   Musculoskeletal:         General: Normal range of motion.      Cervical back: Normal range of motion and neck supple.   Skin:     General: Skin is warm and dry.      Capillary Refill: Capillary refill takes less than 2 seconds.    Neurological:      General: No focal deficit present.      Mental Status: She is alert and oriented to person, place, and time.   Psychiatric:         Mood and Affect: Mood normal.         Behavior: Behavior normal.         Thought Content: Thought content normal.       Significant Labs: All pertinent labs within the past 24 hours have been reviewed.    Significant Imaging: I have reviewed all pertinent imaging results/findings within the past 24 hours.      Assessment/Plan:      Acute hypoxemic respiratory failure  Cont HFNC wean if tolerated     Acute on chronic systolic CHF (congestive heart failure)  Patient is identified as having Systolic (HFrEF) heart failure that is Acute on chronic. CHF is currently uncontrolled due to Continued edema of extremities. Latest ECHO performed and demonstrates- Results for orders placed during the hospital encounter of 02/13/23    Echo    Interpretation Summary  Normal LV function-EF 55%  Indeterminant diastolic function  Mild to moderate tricuspid regurgitation  RVSP 40 mm Hg  IVC dilated  Pleural effusion  . Continue Beta Blocker and monitor clinical status closely. Monitor on telemetry. Patient is on CHF pathway.  Monitor strict Is&Os and daily weights.  Place on fluid restriction of 1.5 L. Continue to stress to patient importance of self efficacy and  on diet for CHF. Last BNP reviewed- and noted below No results for input(s): BNP, BNPTRIAGEBLO in the last 168 hours..    consult cardio cont in lasix      VTE Risk Mitigation (From admission, onward)         Ordered     IP VTE HIGH RISK PATIENT  Once         02/13/23 1953     Place sequential compression device  Until discontinued         02/13/23 1953                Discharge Planning   SAMREEN:      Code Status: DNR   Is the patient medically ready for discharge?:     Reason for patient still in hospital (select all that apply): Patient trending condition, Laboratory test, Treatment and Consult  recommendations  Discharge Plan A: Return to nursing home                  Juice Rodriguez MD  Department of Hospital Medicine   [facility]-Med/Surg

## 2023-02-14 NOTE — PLAN OF CARE
02/14/23 0943   Discharge Assessment   Assessment Type Discharge Planning Assessment   Source of Information health record   When was your last doctors appointment? 01/10/23  (unsure - nursing home resident)   Communicated SAMREEN with patient/caregiver Date not available/Unable to determine   Reason For Admission CHF   People in Home facility resident   Facility Arrived From: [facility]   Do you expect to return to your current living situation? Yes   Do you have help at home or someone to help you manage your care at home? Yes   Who are your caregiver(s) and their phone number(s)? [facility] staff   Prior to hospitilization cognitive status: Unable to Assess   Current cognitive status: Unable to Assess   Walking or Climbing Stairs ambulation difficulty, requires equipment;ambulation difficulty, assistance 1 person;transferring difficulty, assistance 1 person   Mobility Management [facility] staff, walker, wc   Dressing/Bathing bathing difficulty, assistance 1 person;dressing difficulty, assistance 1 person;dressing difficulty, requires equipment;bathing difficulty, requires equipment   Dressing/Bathing Management [facility] staff   Do you have any problems with:   (NH resident)   Home Accessibility wheelchair accessible   Equipment Currently Used at Home rollator;wheelchair;oxygen   Readmission within 30 days? No   Patient currently being followed by outpatient case management? No   Do you currently have service(s) that help you manage your care at home? Yes  (NH resident)   Name and Contact number of agency [facility]   Is the pt/caregiver preference to resume services with current agency Yes   Do you take prescription medications? Yes   Do you have prescription coverage? Yes   Coverage MCR   Do you have any problems affording any of your prescribed medications? No   Who is going to help you get home at discharge? [facility]   How do you get to doctors appointments? other (see comments)  (facility)   Are you on dialysis? No   Discharge Plan A Return to  nursing home   Discharge Plan B Return to Nursing Home   DME Needed Upon Discharge  none   Discharge Plan discussed with:   ([facility] staff)   Physical Activity   On average, how many days per week do you engage in moderate to strenuous exercise (like a brisk walk)? 5 days   On average, how many minutes do you engage in exercise at this level? 20 min   Financial Resource Strain   How hard is it for you to pay for the very basics like food, housing, medical care, and heating? Not hard   Housing Stability   In the last 12 months, was there a time when you were not able to pay the mortgage or rent on time? N   In the last 12 months, how many places have you lived? 1   In the last 12 months, was there a time when you did not have a steady place to sleep or slept in a shelter (including now)? N   Transportation Needs   In the past 12 months, has lack of transportation kept you from medical appointments or from getting medications? no   In the past 12 months, has lack of transportation kept you from meetings, work, or from getting things needed for daily living? No   Food Insecurity   Within the past 12 months, you worried that your food would run out before you got the money to buy more. Never true   Within the past 12 months, the food you bought just didn't last and you didn't have money to get more. Never true   Stress   Do you feel stress - tense, restless, nervous, or anxious, or unable to sleep at night because your mind is troubled all the time - these days?   (unable to answer)   Social Connections   In a typical week, how many times do you talk on the phone with family, friends, or neighbors? More than 3   How often do you get together with friends or relatives? More than 3   How often do you attend Moravian or Advent services? Never   Do you belong to any clubs or organizations such as Moravian groups, unions, fraternal or athletic groups, or school groups? No   How often do you attend meetings of the clubs or  organizations you belong to? Never   Alcohol Use   Q1: How often do you have a drink containing alcohol? Never   Q2: How many drinks containing alcohol do you have on a typical day when you are drinking? None   Q3: How often do you have six or more drinks on one occasion? Never   OTHER   Name(s) of People in Home NOAH

## 2023-02-14 NOTE — SUBJECTIVE & OBJECTIVE
Review of Systems   Constitutional:  Negative for chills, diaphoresis, fatigue and fever.   HENT:  Negative for congestion, hearing loss, sore throat and tinnitus.    Eyes:  Negative for photophobia.   Respiratory:  Negative for cough, chest tightness and shortness of breath.    Cardiovascular:  Negative for chest pain and palpitations.   Gastrointestinal:  Negative for abdominal pain, blood in stool, constipation, diarrhea and nausea.   Endocrine: Negative for cold intolerance and heat intolerance.   Genitourinary:  Negative for dysuria, frequency and hematuria.   Neurological:  Negative for seizures, weakness, light-headedness and headaches.   Psychiatric/Behavioral:  Negative for hallucinations and suicidal ideas. The patient is not nervous/anxious.    Objective:     Vital Signs (Most Recent):  Temp: 96.6 °F (35.9 °C) (02/14/23 1101)  Pulse: 65 (02/14/23 1145)  Resp: (!) 24 (02/14/23 1145)  BP: (!) 124/48 (02/14/23 1101)  SpO2: (!) 93 % (02/14/23 1145)   Vital Signs (24h Range):  Temp:  [96.6 °F (35.9 °C)-97.8 °F (36.6 °C)] 96.6 °F (35.9 °C)  Pulse:  [8-96] 65  Resp:  [18-26] 24  SpO2:  [90 %-96 %] 93 %  BP: (105-128)/(40-63) 124/48     Weight: 101.2 kg (223 lb)  Body mass index is 38.28 kg/m².    Intake/Output Summary (Last 24 hours) at 2/14/2023 1416  Last data filed at 2/14/2023 0830  Gross per 24 hour   Intake 120 ml   Output 1210 ml   Net -1090 ml      Physical Exam  Vitals and nursing note reviewed.   Constitutional:       General: She is not in acute distress.     Appearance: Normal appearance. She is normal weight. She is not ill-appearing.   HENT:      Head: Normocephalic and atraumatic.      Right Ear: Tympanic membrane normal.      Left Ear: Tympanic membrane normal.      Nose: Nose normal.      Mouth/Throat:      Mouth: Mucous membranes are moist.      Pharynx: Oropharynx is clear.   Eyes:      Extraocular Movements: Extraocular movements intact.      Pupils: Pupils are equal, round, and reactive  to light.   Cardiovascular:      Rate and Rhythm: Normal rate and regular rhythm.      Pulses: Normal pulses.      Heart sounds: Normal heart sounds.   Pulmonary:      Effort: Pulmonary effort is normal.      Breath sounds: Normal breath sounds.   Abdominal:      General: Abdomen is flat. Bowel sounds are normal.      Palpations: Abdomen is soft.   Genitourinary:     Rectum: Normal.   Musculoskeletal:         General: Normal range of motion.      Cervical back: Normal range of motion and neck supple.   Skin:     General: Skin is warm and dry.      Capillary Refill: Capillary refill takes less than 2 seconds.   Neurological:      General: No focal deficit present.      Mental Status: She is alert and oriented to person, place, and time.   Psychiatric:         Mood and Affect: Mood normal.         Behavior: Behavior normal.         Thought Content: Thought content normal.       Significant Labs: All pertinent labs within the past 24 hours have been reviewed.    Significant Imaging: I have reviewed all pertinent imaging results/findings within the past 24 hours.

## 2023-02-15 NOTE — PLAN OF CARE
Rec'd call from  ( Maria KEARNEY ) to arrange hospice info visit. Referral made to [facility] per phone/fax , spoke with Nola.

## 2023-02-15 NOTE — PLAN OF CARE
[facility] nurse ( Dev Garcia ) met with pt's spouse and he agreed to hospice services. Patient to be admitted to general inpatient hospice with [facility]. [facility] notified of hospice consult with [facility] for general inpt, spoke with Pam Bal RN.

## 2023-02-15 NOTE — SUBJECTIVE & OBJECTIVE
Interval History:     Review of Systems   Reason unable to perform ROS: Dementia.   Objective:     Vital Signs (Most Recent):  Temp: 97.9 °F (36.6 °C) (02/14/23 1902)  Pulse: 76 (02/14/23 1902)  Resp: 19 (02/14/23 1902)  BP: (!) 94/49 (02/14/23 1902)  SpO2: (!) 94 % (02/14/23 1902)   Vital Signs (24h Range):  Temp:  [96.6 °F (35.9 °C)-97.9 °F (36.6 °C)] 97.9 °F (36.6 °C)  Pulse:  [61-76] 76  Resp:  [18-26] 19  SpO2:  [92 %-96 %] 94 %  BP: ()/(40-54) 94/49     Weight: 100.5 kg (221 lb 8 oz)  Body mass index is 38.02 kg/m².     SpO2: (!) 94 %         Intake/Output Summary (Last 24 hours) at 2/14/2023 1948  Last data filed at 2/14/2023 1809  Gross per 24 hour   Intake 240 ml   Output 1860 ml   Net -1620 ml       Lines/Drains/Airways       Drain  Duration                  Closed/Suction Drain Left;Lateral Hip Bulb -- days         Drain/Device  12/09/22 0800 Left hip other (see comments) 67 days         Urethral Catheter 02/13/23 1045 16 Fr. 1 day              Peripheral Intravenous Line  Duration                  Peripheral IV - Single Lumen 02/13/23 0905 20 G Left Antecubital 1 day                    Physical Exam  Constitutional:       Comments:   Bedridden, debilitated, chronic ill appearance   HENT:      Head: Normocephalic and atraumatic.      Mouth/Throat:      Mouth: Mucous membranes are dry.   Neck:      Comments: Mild JVD  Cardiovascular:      Rate and Rhythm: Regular rhythm.      Comments: + S4 gallop, soft systolic murmur left sternal border.  Pulmonary:      Comments: Poor inspiratory effort  Abdominal:      Palpations: Abdomen is soft.   Musculoskeletal:      Comments: Muscular contracture  Left Hip drain attached, left hip, sacrococcyx decubitus   Skin:     Comments: As above   Neurological:      Comments: Morbid obese female, bed ridden , chronically ill in appearance       Significant Labs: CMP   Recent Labs   Lab 02/13/23  0954 02/14/23  0422   * 134*   K 4.3 4.6   CO2 27 30   BUN 42.0*  48.0*   CREATININE 1.58* 1.78*   CALCIUM 7.2* 6.7*   ALBUMIN 2.5* 2.1*   BILITOT 0.2 0.2   ALKPHOS 195* 154*   AST 58* 40*   ALT 28 20    and CBC   Recent Labs   Lab 02/13/23  0954 02/14/23  0423   WBC 6.0 3.3*   HGB 9.6* 8.6*   HCT 30.6* 26.6*   * 453*       Significant Imaging: X-Ray: CXR: X-Ray Chest 1 View (CXR):   Results for orders placed or performed during the hospital encounter of 02/13/23   X-Ray Chest 1 View    Narrative    EXAMINATION:  STUDY: XR CHEST 1 VIEW    CLINICAL HISTORY AND TECHNIQUE:  RT Shaka on 2/13/2023 10:01 AM    CURRENT CLINICAL HISTORY: ER PT    X 1 DAY    -SOB, COUGH, CONGESTION, FLUID OVERLOAD    PAST MEDICAL HISTORY: CV-, SMOKER, COPD, EMPHYSEMA, DIABETES    TECHNIQUE: 1 VIEW CHEST PORTABLE    TECHNOLOGIST: NORRIS    COMPARISON:  12/05/2022    FINDINGS:  The heart is at least moderately enlarged with vascular congestion and fairly prominent changes of pulmonary edema.Dense clouding and consolidation are noted within the right mid lower lung field and to a lesser extent the left lower lung field with small, bilateral pleural effusions noted layering dependently. There is moderate demineralization of the skeletal structures with mild to moderate degenerative changes noted involving the thoracic spine.      Impression    1. The heart is moderately enlarged with vascular congestion and fairly prominent changes of pulmonary edema with small bilateral pleural effusions noted layering dependently.  See above comments.      Electronically signed by: Papo Hahn  Date:    02/13/2023  Time:    11:04

## 2023-02-15 NOTE — PROGRESS NOTES
[facility]Med/[facility]urg  Cardiology  Progress Note    Patient Name: Shakira Shaw  MRN: [id number]  Admission Date: 2/13/2023  Hospital Length of Stay: 2 days  Code Status: DNR   Attending Physician: Juice Rodriguez MD   Primary Care Physician: Rafi Baker MD  Expected Discharge Date: 2/15/2023  Principal Problem:Acute hypoxemic respiratory failure    Subjective:     87-year-old female, Nursing home resident,underlying history of chronic congestive heart failure, hypertension, diabetes mellitus, chronic renal disease, morbid obesity, bedridden, underlying dementia, Bedridden,recent left hip fracture, Left hip, sacrococcyx decubitus ulcer,  admitted for acute on chronic diastolic heart failure.  Cardiology consult for an intermittent Mobitz type I heart block Noted on telemetry monitoring    Lowest HR in 60  Telemetry strips - no prolonged pauses , high grade AV block    Objective:     Vital Signs (Most Recent):  Temp: 97.4 °F (36.3 °C) (02/15/23 0701)  Pulse: 86 (02/15/23 0705)  Resp: (!) 22 (02/15/23 0901)  BP: 103/69 (02/15/23 0701)  SpO2: (!) 91 % (02/15/23 1220)   Vital Signs (24h Range):  Temp:  [96.8 °F (36 °C)-98.5 °F (36.9 °C)] 97.4 °F (36.3 °C)  Pulse:  [56-94] 86  Resp:  [19-25] 22  SpO2:  [91 %-99 %] 91 %  BP: ()/(45-69) 103/69     Weight: 100.5 kg (221 lb 8 oz)  Body mass index is 38.02 kg/m².     SpO2: (!) 91 %         Intake/Output Summary (Last 24 hours) at 2/15/2023 1245  Last data filed at 2/15/2023 0830  Gross per 24 hour   Intake 120 ml   Output 1550 ml   Net -1430 ml       Lines/Drains/Airways       Drain  Duration                  Closed/Suction Drain Left;Lateral Hip Bulb -- days         Drain/Device  12/09/22 0800 Left hip other (see comments) 68 days         Urethral Catheter 02/13/23 1045 16 Fr. 2 days              Peripheral Intravenous Line  Duration                  Peripheral IV - Single Lumen 02/13/23 0905 20 G Left Antecubital 2 days                    Physical  Exam  Constitutional:       Comments:   Bedridden, debilitated, chronic ill appearance   HENT:      Head: Normocephalic and atraumatic.      Mouth/Throat:      Mouth: Mucous membranes are dry.   Neck:      Comments: Mild JVD  Cardiovascular:      Rate and Rhythm: Regular rhythm.      Comments: + S4 gallop, soft systolic murmur left sternal border.  Pulmonary:      Comments: Poor inspiratory effort  Abdominal:      Palpations: Abdomen is soft.   Musculoskeletal:      Comments: Muscular contracture  Left Hip drain attached, left hip, sacrococcyx decubitus   Skin:     Comments: As above   Neurological:      Comments: Morbid obese female, bed ridden , chronically ill in appearance       Significant Labs: CMP   Recent Labs   Lab 02/14/23  0422 02/15/23  0518   * 136   K 4.6 4.6   CO2 30 31   BUN 48.0* 51.0*   CREATININE 1.78* 1.75*   CALCIUM 6.7* 6.7*   ALBUMIN 2.1* 2.3*   BILITOT 0.2 0.3   ALKPHOS 154* 163*   AST 40* 63*   ALT 20 30    and CBC   Recent Labs   Lab 02/14/23  0423 02/15/23  0518   WBC 3.3* 11.0   HGB 8.6* 9.5*   HCT 26.6* 29.0*   * 634*           Assessment and Plan:     Acute on chronic diastolic heart failure  Intermittent Mobitz type I  Hypertension  Chronic renal disease  Diabetes mellitus  Bed ridden  BMI 38/morbid obesity  End-stage dementia  Recent left hip fracture  Left hip/sacrococcyx decubitus ulcer        Patient obvious high risk for device infection/invasive procedure complication  BB D/C  Judicious diuretic / Lytes + prerenal component  Refrain NSAID /prostaglandin inhibition  Medical therapy/supportive care  Prognosis poor      Bryce Rm MD  Cardiology  [facility]

## 2023-02-15 NOTE — PROGRESS NOTES
[facility]Med/Surg  Cardiology  Progress Note    Patient Name: Shakira Shaw  MRN: [id number]  Admission Date: 2/13/2023  Hospital Length of Stay: 1 days  Code Status: DNR   Attending Physician: Juice Rodriguez MD   Primary Care Physician: Rafi Baker MD  Expected Discharge Date:   Principal Problem:<principal problem not specified>    Subjective:     87-year-old female, Nursing home resident,underlying history of chronic congestive heart failure, hypertension, diabetes mellitus, chronic renal disease, morbid obesity, bedridden, underlying dementia, Bedridden,recent left hip fracture, Left hip, sacrococcyx decubitus ulcer,  admitted for acute on chronic diastolic heart failure.  Cardiology consult for an intermittent Mobitz type I heart block Noted on telemetry monitoring.    Past medical history-as above    Medication as listed    Social history - Not able to obtain    Family history -Not able to obtain    Reviews of systems not able to obtain due to underlying dementia      Reason unable to perform ROS: Dementia.   Objective:     Vital Signs (Most Recent):  Temp: 97.9 °F (36.6 °C) (02/14/23 1902)  Pulse: 76 (02/14/23 1902)  Resp: 19 (02/14/23 1902)  BP: (!) 94/49 (02/14/23 1902)  SpO2: (!) 94 % (02/14/23 1902)   Vital Signs (24h Range):  Temp:  [96.6 °F (35.9 °C)-97.9 °F (36.6 °C)] 97.9 °F (36.6 °C)  Pulse:  [61-76] 76  Resp:  [18-26] 19  SpO2:  [92 %-96 %] 94 %  BP: ()/(40-54) 94/49     Weight: 100.5 kg (221 lb 8 oz)  Body mass index is 38.02 kg/m².     SpO2: (!) 94 %         Physical Exam  Constitutional:       Comments:   Bedridden, debilitated, chronic ill appearance   HENT:      Head: Normocephalic and atraumatic.      Mouth/Throat:      Mouth: Mucous membranes are dry.   Neck:      Comments: Mild JVD  Cardiovascular:      Rate and Rhythm: Regular rhythm.      Comments: + S4 gallop, soft systolic murmur left sternal border.  Pulmonary:      Comments: Poor inspiratory  effort  Abdominal:      Palpations: Abdomen is soft.   Musculoskeletal:   Pretibial edema  Left Hip MAURICIO drain attached, left hip, sacrococcyx decubitus   Skin:     Comments: As above   Neurological:      Comments: Morbid obese female, bed ridden , chronically ill in appearance , Debilitated      Significant Labs: CMP   Recent Labs   Lab 02/13/23  0954 02/14/23  0422   * 134*   K 4.3 4.6   CO2 27 30   BUN 42.0* 48.0*   CREATININE 1.58* 1.78*   CALCIUM 7.2* 6.7*   ALBUMIN 2.5* 2.1*   BILITOT 0.2 0.2   ALKPHOS 195* 154*   AST 58* 40*   ALT 28 20    and CBC   Recent Labs   Lab 02/13/23  0954 02/14/23  0423   WBC 6.0 3.3*   HGB 9.6* 8.6*   HCT 30.6* 26.6*   * 453*     Chest x-ray-cardiomegaly, pulmonary edema, large right-sided pleural effusion    Telemetry monitoring strips-no obvious prolonged pauses or high-grade AV block    Echo EF preserved,Diastolic  Function indeterminant, IVC is dilated, moderate elevated RVSP  Assessment and Plan:     Acute on chronic diastolic heart failure  Intermittent Mobitz type I  Hypertension  Chronic renal disease  Diabetes mellitus  Bed ridden  BMI 38/morbid obesity  End-stage dementia  Recent left hip fracture  Left hip/sacrococcyx decubitus ulcer      Patient obvious high risk for device infection/invasive procedure complication  Discontinue beta-blockers  Judicious diuretic  Refrain NSAID /prostaglandin inhibition  Medical therapy/supportive care  Prognosis poor      Bryce Rm MD  Cardiology  [facility]-Med/Surg

## 2023-02-15 NOTE — DISCHARGE SUMMARY
Hospital Medicine  Discharge Summary    Patient Name: Shakira Shaw  MRN: [id number]  Admit Date: 2/13/2023  Discharge Date:    Status: IP- Inpatient   Length of Stay: 2      PHYSICIANS   Admitting Physician: Juice Rodriguez MD  Discharging Physician: Juice Rodriguez MD.  Primary Care Physician: Rafi Baker MD        DISCHARGE DIAGNOSES     Acute hypoxemic respiratory failure  Acute on chronic systolic CHF (congestive heart failure)    PROCEDURES         HOSPITAL COURSE    Unable to wean oxygen supprot overnighrt   Mental status poor and BP dropping   Evaluated by Hospice this AM rec GIP at this time       STATUS  stable    DISPOSITION  Discharge to inpatient hospice    DIET  reg    ACTIVITY  As tolerated      FOLLOW-UP         DISCHARGE MEDICATION RECONCILIATION        Medication List        ASK your doctor about these medications      acetaZOLAMIDE 250 MG tablet  Commonly known as: DIAMOX     alendronate 70 MG tablet  Commonly known as: FOSAMAX     ANORO ELLIPTA 62.5-25 mcg/actuation Dsdv  Generic drug: umeclidinium-vilanteroL     ARGIMENT AT 10 gram-7 gram/42.75 gram Pwpk  Generic drug: whey-arg-glut-C-Zn-Cu-tos     ascorbic acid (vitamin C) 500 MG tablet  Commonly known as: VITAMIN C     b complex vitamins capsule     benzonatate 200 MG capsule  Commonly known as: TESSALON     ciprofloxacin HCl 250 MG tablet  Commonly known as: CIPRO     fluticasone propionate 50 mcg/actuation Dsdv  Commonly known as: FLOVENT DISKUS     furosemide 20 MG tablet  Commonly known as: LASIX     levothyroxine 125 MCG tablet  Commonly known as: SYNTHROID     lovastatin 40 MG tablet  Commonly known as: MEVACOR     metoprolol tartrate 25 MG tablet  Commonly known as: LOPRESSOR     pantoprazole 40 MG tablet  Commonly known as: PROTONIX     prednisoLONE acetate 1 % Drps  Commonly known as: PRED FORTE     senna 8.6 mg tablet  Commonly known as: SENOKOT     traZODone 50 MG tablet  Commonly known as: DESYREL     zinc sulfate 50 mg  zinc (220 mg) capsule  Commonly known as: ZINCATE                PHYSICAL EXAM   VITALS: T 97 °F (36.1 °C)   BP 99/61   P 90   RR 18   O2 (!) 91 %    Physical Exam  Vitals and nursing note reviewed.   Constitutional:       Appearance: She is ill-appearing.   Cardiovascular:      Rate and Rhythm: Normal rate.      Heart sounds: Normal heart sounds.   Pulmonary:      Comments: Diminished bilaterally   Skin:     Comments: Left hip surgical incisions x 2 with orly drain  Coccyx unstageable POA            Discharge time: 33 minutes     Juice Rodriguez MD  [facility]       DIAGNOSITCS   CBC:   Recent Labs   Lab 02/13/23  0954 02/14/23  0423 02/15/23  0518   WBC 6.0 3.3* 11.0   HGB 9.6* 8.6* 9.5*   HCT 30.6* 26.6* 29.0*   * 453* 634*     COAG:  No results for input(s): APTT, INR, PTT in the last 168 hours.  CMP:   Recent Labs   Lab 02/13/23  0954 02/14/23  0422 02/15/23  0518   CALCIUM 7.2* 6.7* 6.7*   ALBUMIN 2.5* 2.1* 2.3*   * 134* 136   K 4.3 4.6 4.6   CO2 27 30 31   BUN 42.0* 48.0* 51.0*   CREATININE 1.58* 1.78* 1.75*   ALKPHOS 195* 154* 163*   ALT 28 20 30   AST 58* 40* 63*   BILITOT 0.2 0.2 0.3   MG  --   --  2.00     Estimated Creatinine Clearance: 26.6 mL/min (A) (based on SCr of 1.75 mg/dL (H)).  CARDIAC ENZYMES:   Recent Labs     02/13/23  1602   TROPONINI 0.016   CPK 34        No results for input(s): AMYLASE, LIPASE in the last 168 hours.      No results for input(s): POCTGLUCOSE in the last 72 hours.     Microbiology Results (last 7 days)       ** No results found for the last 168 hours. **               X-Ray Chest 1 View    Result Date: 2/13/2023  EXAMINATION: STUDY: XR CHEST 1 VIEW CLINICAL HISTORY AND TECHNIQUE: RT Shaka on 2/13/2023 10:01 AM CURRENT CLINICAL HISTORY: ER PT X 1 DAY -SOB, COUGH, CONGESTION, FLUID OVERLOAD PAST MEDICAL HISTORY: CV-, SMOKER, COPD, EMPHYSEMA, DIABETES TECHNIQUE: 1 VIEW CHEST PORTABLE TECHNOLOGIST: NORRIS COMPARISON: 12/05/2022 FINDINGS: The heart is  at least moderately enlarged with vascular congestion and fairly prominent changes of pulmonary edema.Dense clouding and consolidation are noted within the right mid lower lung field and to a lesser extent the left lower lung field with small, bilateral pleural effusions noted layering dependently. There is moderate demineralization of the skeletal structures with mild to moderate degenerative changes noted involving the thoracic spine.     1. The heart is moderately enlarged with vascular congestion and fairly prominent changes of pulmonary edema with small bilateral pleural effusions noted layering dependently.  See above comments. Electronically signed by: Papo Hahn Date:    02/13/2023 Time:    11:04    X-Ray Chest AP Portable    Result Date: 2/15/2023  EXAMINATION: STUDY: XR CHEST AP PORTABLE CLINICAL HISTORY AND TECHNIQUE: RT Meenu on 2/15/2023  4:47 AM CURRENT CLINICAL HISTORY:  X 3 DAYS -SOB, COUGH, CONGESTION, FLUID OVERLOAD PAST MEDICAL HISTORY: CV-, SMOKER, COPD, EMPHYSEMA, DIABETES TECHNIQUE: 1 VIEW CHEST PORTABLE TECHNOLOGIST: ANTHONY COMPARISON: 02/13/2023 FINDINGS: The heart is moderately enlarged with vascular congestion and moderate changes of pulmonary edema which do not appear to have changed significantly since the previous examination, accounting for the improved inspiratory effort on today's exam.Patchy clouding is noted within the right lower lung field with a mild-to-moderate right-sided pleural effusion blunting the costophrenic angle. There is moderate demineralization of the skeletal structures with moderate degenerative changes noted throughout the thoracic spine.     1. There is been little interval change when compared to the previous examination.  See above comments. Electronically signed by: Papo Hahn Date:    02/15/2023 Time:    04:52    Echo    Result Date: 2/14/2023  Normal LV function-EF 55% Indeterminant diastolic function Mild to moderate tricuspid regurgitation RVSP  40 mm Hg IVC dilated Pleural effusion

## 2023-02-15 NOTE — SUBJECTIVE & OBJECTIVE
Interval History:     ROS  Objective:     Vital Signs (Most Recent):  Temp: 97.4 °F (36.3 °C) (02/15/23 0701)  Pulse: 86 (02/15/23 0705)  Resp: (!) 22 (02/15/23 0901)  BP: 103/69 (02/15/23 0701)  SpO2: (!) 91 % (02/15/23 1220)   Vital Signs (24h Range):  Temp:  [96.8 °F (36 °C)-98.5 °F (36.9 °C)] 97.4 °F (36.3 °C)  Pulse:  [56-94] 86  Resp:  [19-25] 22  SpO2:  [91 %-99 %] 91 %  BP: ()/(45-69) 103/69     Weight: 100.5 kg (221 lb 8 oz)  Body mass index is 38.02 kg/m².     SpO2: (!) 91 %         Intake/Output Summary (Last 24 hours) at 2/15/2023 1245  Last data filed at 2/15/2023 0830  Gross per 24 hour   Intake 120 ml   Output 1550 ml   Net -1430 ml       Lines/Drains/Airways       Drain  Duration                  Closed/Suction Drain Left;Lateral Hip Bulb -- days         Drain/Device  12/09/22 0800 Left hip other (see comments) 68 days         Urethral Catheter 02/13/23 1045 16 Fr. 2 days              Peripheral Intravenous Line  Duration                  Peripheral IV - Single Lumen 02/13/23 0905 20 G Left Antecubital 2 days                    Physical Exam  Constitutional:       Comments:   Bedridden, debilitated, chronic ill appearance   HENT:      Head: Normocephalic and atraumatic.      Mouth/Throat:      Mouth: Mucous membranes are dry.   Neck:      Comments: Mild JVD  Cardiovascular:      Rate and Rhythm: Regular rhythm.      Comments: + S4 gallop, soft systolic murmur left sternal border.  Pulmonary:      Comments: Poor inspiratory effort  Abdominal:      Palpations: Abdomen is soft.   Musculoskeletal:      Comments: Muscular contracture  Left Hip drain attached, left hip, sacrococcyx decubitus   Skin:     Comments: As above   Neurological:      Comments: Morbid obese female, bed ridden , chronically ill in appearance       Significant Labs: CMP   Recent Labs   Lab 02/14/23  0422 02/15/23  0518   * 136   K 4.6 4.6   CO2 30 31   BUN 48.0* 51.0*   CREATININE 1.78* 1.75*   CALCIUM 6.7* 6.7*   ALBUMIN  2.1* 2.3*   BILITOT 0.2 0.3   ALKPHOS 154* 163*   AST 40* 63*   ALT 20 30    and CBC   Recent Labs   Lab 02/14/23  0423 02/15/23  0518   WBC 3.3* 11.0   HGB 8.6* 9.5*   HCT 26.6* 29.0*   * 634*

## 2023-02-15 NOTE — PLAN OF CARE
Hospice informational visit ordered,spoke with  and gave him Hospice list. His preference is [facility]. [place] of choice signed   Message to provider:  [employer] received 3 phone calls from patient and on the 3 rd phone call patient was upset that his refill was not done and he said, \" I don't understand why it take 72 hrs for a refill when all the nurses have to do is put in the refill.  Write asked patient to stop yelling and that the 72 hrs will not up until the next day.  Patient was still yelling and write had to end the phone call.    [] Writer advised caller of callback from clinic within 24-72 hours

## 2023-02-16 NOTE — PROGRESS NOTES
Hospital Medicine  Progress Note    Patient Name: Shakira Shaw  MRN: [id number]  Status: IP- Inpatient   Admission Date: 2/15/2023  Length of Stay: 1      CC: hospital follow-up for        SUBJECTIVE   87 y.o. female admitted for acute respiratory failure and terminal agitation to [facility].  02/16/2023  Increased oxygen support overnight along with agitation     Today: Patient seen and examined at bedside, and chart reviewed.       MEDICATIONS   Scheduled   acetaZOLAMIDE  250 mg Oral TID    aluminum-magnesium hydroxide-simethicone  30 mL Oral QID (AC & HS)    atorvastatin  10 mg Oral Daily    budesonide  1 mg Nebulization Q12H    levothyroxine  125 mcg Oral Before breakfast    miconazole NITRATE 2 %   Topical (Top) BID    mupirocin   Nasal BID    pantoprazole  40 mg Oral Daily    Questran and Aquaphor Topical Compound   Topical (Top) Daily    sodium chloride 0.9%  10 mL Intravenous Q8H    sucralfate  1 g Oral Q6H    traZODone  50 mg Oral QHS     Continuous Infusions        PHYSICAL EXAM   VITALS: T 97.4 °F (36.3 °C)   BP (!) 97/41   P 90   RR 18   O2 (!) 90 %    GENERAL: Awake and in NAD  LUNGS: CTA B/L  CVS: Normal rate  GI/: Soft, NT, bowel sounds positive.  EXTREMITIES: No peripheral edema  NEURO: AAOx3  PSYCH: Cooperative      LABS   CBC  Recent Labs     02/14/23  0423 02/15/23  0518   WBC 3.3* 11.0   RBC 2.91* 3.19*   HGB 8.6* 9.5*   HCT 26.6* 29.0*   MCV 91.4 90.9   MCH 29.6 29.8   MCHC 32.3 32.8   RDW 20.1* 20.4*   * 634*     CHEM  Recent Labs     02/14/23  0422 02/15/23  0518   * 136   K 4.6 4.6   CHLORIDE 102 101   CO2 30 31   BUN 48.0* 51.0*   CREATININE 1.78* 1.75*   GLUCOSE 156* 92   CALCIUM 6.7* 6.7*   MG  --  2.00   ALBUMIN 2.1* 2.3*   GLOBULIN 2.5 2.7   ALKPHOS 154* 163*   ALT 20 30   AST 40* 63*   BILITOT 0.2 0.3         MICROBIOLOGY     Microbiology Results (last 7 days)       ** No results found for the last 168 hours. **              DIAGNOSTICS   X-Ray Chest AP Portable  Narrative:  EXAMINATION:  STUDY: XR CHEST AP PORTABLE    CLINICAL HISTORY AND TECHNIQUE:  Arben Molina RT on 2/15/2023  4:47 AM    CURRENT CLINICAL HISTORY:     X 3 DAYS    -SOB, COUGH, CONGESTION, FLUID OVERLOAD    PAST MEDICAL HISTORY: CV-, SMOKER, COPD, EMPHYSEMA, DIABETES    TECHNIQUE: 1 VIEW CHEST PORTABLE    TECHNOLOGIST: ANTHONY    COMPARISON:  02/13/2023    FINDINGS:  The heart is moderately enlarged with vascular congestion and moderate changes of pulmonary edema which do not appear to have changed significantly since the previous examination, accounting for the improved inspiratory effort on today's exam.Patchy clouding is noted within the right lower lung field with a mild-to-moderate right-sided pleural effusion blunting the costophrenic angle. There is moderate demineralization of the skeletal structures with moderate degenerative changes noted throughout the thoracic spine.  Impression: 1. There is been little interval change when compared to the previous examination.  See above comments.    Electronically signed by: Papo Hahn  Date:    02/15/2023  Time:    04:52        ASSESSMENT   Acute hypoxemic respiratory failure  Acute on chronic systolic CHF (congestive heart failure)    PLAN   Continue morphine drip         Prophylaxis: lovenox         Juice Rodriguez MD  [facility]

## 2023-02-16 NOTE — PLAN OF CARE
Problem: Adult Inpatient Plan of Care  Goal: Plan of Care Review  Outcome: Ongoing, Progressing  Goal: Patient-Specific Goal (Individualized)  Outcome: Ongoing, Progressing  Goal: Optimal Comfort and Wellbeing  Outcome: Ongoing, Progressing     Problem: Fall Injury Risk  Goal: Absence of Fall and Fall-Related Injury  Outcome: Ongoing, Progressing     Problem: End-of-Life Care  Goal: Comfort, Peace and Preserved Dignity  Outcome: Ongoing, Progressing

## 2023-02-16 NOTE — CONSULTS
Patient was being seen prior to admission into [facility] hospice.  Will continue to follow patient while in [facility] hospice.  No changes made to orders at this time. Plan to see patient on 2/17/2023.

## 2023-02-16 NOTE — CONSULTS
"Inpatient Nutrition Evaluation    Admit Date: 2/16/2023   Total duration of encounter: 1 day    Nutrition Recommendation/Prescription     Continue Cardiac diet and Pro-Stat BID (providing 200 kcal, 30 g protein). as tolerated.    Continue to encourage PO intake as needed.    RD to sign off on patient care. If family or patient decide to implement nutritional support please consult dietitian and RD to make appropriate recommendations.    Thank you for the consult.     Nutrition Assessment     Chart Review    Reason Seen: continuous nutrition monitoring and physician consult for wound    Malnutrition Screening Tool Results   Have you recently lost weight without trying?: No (Unable to assess)  Have you been eating poorly because of a decreased appetite?: No (Unable to assess)   MST Score: 0     Diagnosis:  Shortness of breath, acute on chronic congestive heart failure      Diet Order: Diet Cardiac  Oral Supplement Order: none  Appetite/Oral Intake: fair/0-25% of meals 13%- 2 meals  Factors Affecting Nutritional Intake: impaired cognitive status/motor control and decreased appetite  Food/Roman [employer]/Cultural Preferences: none reported  Food Allergies: no known food allergies    Skin Integrity: bruised (ecchymotic)  Wound(s):       Comments  (2/16): RD was following patient prior to transfer to [facility]. Due to change in care, RD signed off on patient care.     (2/14): Patient was asleep, however patient's  was able to answer questions.  reports that patient has had a fair appetite and usually does not eat very much. He also reported that patient has had no chewing or swallowing difficulties as well as no nausea, vomiting, diarrhea, or constipation. He claims that patient has no known food allergies. He reports that patient has never had supplements, but is willing to try some.      Anthropometrics    Height: 5' 4" (162.6 cm)    Last Weight: 100.2 kg (220 lb 14.4 oz) (02/15/23 2100) Weight Method: Bed Scale  BMI " (Calculated): 37.9  BMI Classification: obese grade II (BMI 35-39.9)     Ideal Body Weight (IBW), Female: 120 lb     % Ideal Body Weight, Female (lb): 184.08 %                             Usual Weight Provided By: family/caregiver    Wt Readings from Last 3 Encounters:   02/15/23 2100 100.2 kg (220 lb 14.4 oz)   02/15/23 1912 100.2 kg (220 lb 14.4 oz)   02/14/23 1902 100.5 kg (221 lb 8 oz)   02/13/23 1845 101.2 kg (223 lb)   02/13/23 0900 101.2 kg (223 lb)   12/21/21 0805 91.2 kg (200 lb 15.9 oz)   09/08/21 0820 93.9 kg (206 lb 15.8 oz)   07/15/21 1417 97.1 kg (213 lb 15.7 oz)   06/15/21 1415 100.2 kg (220 lb 15.8 oz)   06/10/21 0846 100.2 kg (220 lb 15.8 oz)   03/03/21 0825 99.3 kg (218 lb 15.7 oz)   02/03/21 1311 99.3 kg (218 lb 15.7 oz)   03/10/20 0856 98.4 kg (216 lb 14.9 oz)   02/05/20 0800 98.2 kg (216 lb 7.9 oz)      Weight Change(s) Since Admission:  Admit Weight: 100.2 kg (220 lb 14.4 oz) (02/15/23 1912)  (2/16): Patient lost 2.2# since (2/14).     (2/14): Patient's usual body weight is ~230 lb per patient's . He also claims that she has no experienced any recent weight loss.    Per EMR: patient has stayed between 206#-223#. Likely patients weight fluctuations are related to fluid retention per diagnosis of CHF.     Patient Education    Not applicable.    Monitoring & Evaluation     Dietitian will monitor food and beverage intake, energy intake, weight, and weight change.  Nutrition Risk/Follow-Up: low (RD to sign off on patient care at this time.)  Patients assigned 'low nutrition risk' status do not qualify for a full nutritional assessment but will be monitored and re-evaluated in a 5-7 day time period. Please consult if re-evaluation needed sooner.

## 2023-02-16 NOTE — PLAN OF CARE
Problem: Adult Inpatient Plan of Care  Goal: Plan of Care Review  Outcome: Ongoing, Progressing  Goal: Patient-Specific Goal (Individualized)  Outcome: Ongoing, Progressing  Goal: Absence of Hospital-Acquired Illness or Injury  Outcome: Ongoing, Progressing  Goal: Optimal Comfort and Wellbeing  Outcome: Ongoing, Progressing  Goal: Readiness for Transition of Care  Outcome: Ongoing, Progressing     Problem: Impaired Wound Healing  Goal: Optimal Wound Healing  Outcome: Ongoing, Progressing     Problem: Fall Injury Risk  Goal: Absence of Fall and Fall-Related Injury  Outcome: Ongoing, Progressing     Problem: Skin Injury Risk Increased  Goal: Skin Health and Integrity  Outcome: Ongoing, Progressing     Problem: End-of-Life Care  Goal: Comfort, Peace and Preserved Dignity  Outcome: Ongoing, Progressing

## 2023-02-16 NOTE — H&P
Hospital Medicine  History & Physical Examination       Patient: Shakira Shaw  MRN: [id number]  STATUS: IP- Inpatient   DOS: 2/16/2023   PCP: Rafi Baker MD      CC: sob       HISTORY OF PRESENT ILLNESS   87 y.o. female admitted for acute respiratory failure and terminal agitation to [facility].      REVIEW OF SYSTEMS   Except as documented, all other systems reviewed and negative       PAST MEDICAL HISTORY     Past Medical History:   Diagnosis Date    CHF (congestive heart failure)     Chronic kidney disease, unspecified     Cognitive communication deficit     COPD (chronic obstructive pulmonary disease)     Diabetes mellitus     Hypertension     Hypoxia     Iron deficiency anemia, unspecified     Obstructive sleep apnea     Thyroid disease           PAST SURGICAL HISTORY     Past Surgical History:   Procedure Laterality Date    FEMUR FRACTURE SURGERY Left           FAMILY HISTORY   Reviewed, negative in relation to patient's current condition.      SOCIAL HISTORY     Social History     Tobacco Use    Smoking status: Never    Smokeless tobacco: Never   Substance Use Topics    Alcohol use: Not Currently          ALLERGIES   Codeine      HOME MEDICATIONS     Current Outpatient Medications   Medication Instructions    acetaZOLAMIDE (DIAMOX) 250 mg, Oral, 3 times daily    alendronate (FOSAMAX) 70 mg, Oral, Every 7 days    ANORO ELLIPTA 62.5-25 mcg/actuation DsDv 1 puff, Inhalation    ascorbic acid (vitamin C) (VITAMIN C) 500 mg, Oral, Daily    b complex vitamins capsule 1 capsule, Oral, Daily    benzonatate (TESSALON) 200 mg, Oral, 2 times daily    ciprofloxacin HCl (CIPRO) 250 mg, Oral, 2 times daily    fluticasone propionate (FLOVENT DISKUS) 50 mcg/actuation DsDv Inhalation, Controller    furosemide (LASIX) 20 mg, Oral, Daily    levothyroxine (SYNTHROID) 125 mcg, Oral, Before breakfast    lovastatin (MEVACOR) 40 mg, Oral    metoprolol tartrate (LOPRESSOR) 12.5 mg    pantoprazole (PROTONIX) 40 mg, Oral, Daily     prednisoLONE acetate (PRED FORTE) 1 % DrpS 1 drop    senna (SENOKOT) 187 mg    traZODone (DESYREL) 50 mg, Oral, Nightly    whey-arg-glut-C-Zn-Cu-tos (ARGIMENT AT) 10 gram-7 gram/42.75 gram PwPk 1 Package, Oral, 2 times daily    zinc sulfate (ZINCATE) 220 mg, Oral, Daily          PHYSICAL EXAM   VITALS: T 97.4 °F (36.3 °C)   BP (!) 97/41   P 90   RR 18   O2 (!) 90 %    GENERAL: Awake and in NAD  HEENT: NC/AT, EOMI, PERRL.  NECK: Supple,  No JVD  LUNGS: CTA B/L  CVS: RRR, S1S2 positive  GI/: Soft, NT/ND, bowel sounds positive.  EXTREMITIES: Peripheral pulses 2+, no peripheral edema  DERM: Warm, dry.  No rashes or lesions noted.  NEURO: AAOx3, no focal neurologic deficit  PSYCH: Cooperative, appropriate mood and affect       DIAGNOSTICS     Recent Labs     02/14/23  0423 02/15/23  0518   WBC 3.3* 11.0   RBC 2.91* 3.19*   HGB 8.6* 9.5*   HCT 26.6* 29.0*   MCV 91.4 90.9   MCH 29.6 29.8   MCHC 32.3 32.8   RDW 20.1* 20.4*   * 634*     No results for input(s): LACTIC in the last 72 hours.  No results for input(s): INR, APTT, D-DIMER in the last 72 hours.  No results for input(s): HGBA1C, CHOL, TRIG, LDL, VLDL, HDL in the last 72 hours.   Recent Labs     02/14/23  0422 02/15/23  0518   * 136   K 4.6 4.6   CHLORIDE 102 101   CO2 30 31   BUN 48.0* 51.0*   CREATININE 1.78* 1.75*   GLUCOSE 156* 92   CALCIUM 6.7* 6.7*   MG  --  2.00   ALBUMIN 2.1* 2.3*   GLOBULIN 2.5 2.7   ALKPHOS 154* 163*   ALT 20 30   AST 40* 63*   BILITOT 0.2 0.3     Recent Labs     02/13/23  1602   CPK 34   TROPONINI 0.016          X-Ray Chest AP Portable  Narrative: EXAMINATION:  STUDY: XR CHEST AP PORTABLE    CLINICAL HISTORY AND TECHNIQUE:  Arben Molina RT on 2/15/2023  4:47 AM    CURRENT CLINICAL HISTORY:     X 3 DAYS    -SOB, COUGH, CONGESTION, FLUID OVERLOAD    PAST MEDICAL HISTORY: CV-, SMOKER, COPD, EMPHYSEMA, DIABETES    TECHNIQUE: 1 VIEW CHEST PORTABLE    TECHNOLOGIST: ANTHONY    COMPARISON:  02/13/2023    FINDINGS:  The heart is  moderately enlarged with vascular congestion and moderate changes of pulmonary edema which do not appear to have changed significantly since the previous examination, accounting for the improved inspiratory effort on today's exam.Patchy clouding is noted within the right lower lung field with a mild-to-moderate right-sided pleural effusion blunting the costophrenic angle. There is moderate demineralization of the skeletal structures with moderate degenerative changes noted throughout the thoracic spine.  Impression: 1. There is been little interval change when compared to the previous examination.  See above comments.    Electronically signed by: Papo Hahn  Date:    02/15/2023  Time:    04:52        ASSESSMENT   Acute hypoxemic respiratory failure  Acute on chronic systolic CHF (congestive heart failure)    PLAN   Admit to inpatient hospice   Comfort measures       Prophylaxis: lovenox   Code Status: DNR      Juice Rodriguez MD  Hospital Medicine        If the patient has been admitted under observation status, it is at my discretion and under our care with hospital medicine services. [TWA]

## 2023-02-17 NOTE — HPI
87 y.o. female admitted for acute respiratory failure and terminal agitation to [facility].

## 2023-02-17 NOTE — PROGRESS NOTES
[facility]-Med/Surg  Wound Care    Patient Name:  Shakira Shaw   MRN:  [id number]  Date: 2/17/2023  Diagnosis: <principal problem not specified>    History:     Past Medical History:   Diagnosis Date    CHF (congestive heart failure)     Chronic kidney disease, unspecified     Cognitive communication deficit     COPD (chronic obstructive pulmonary disease)     Diabetes mellitus     Hypertension     Hypoxia     Iron deficiency anemia, unspecified     Obstructive sleep apnea     Thyroid disease        Social History     Socioeconomic History    Marital status:    Tobacco Use    Smoking status: Never    Smokeless tobacco: Never   Substance and Sexual Activity    Alcohol use: Not Currently    Drug use: Never    Sexual activity: Not Currently     Social Determinants of Health     Financial Resource Strain: Low Risk     Difficulty of Paying Living Expenses: Not hard at all   Food Insecurity: No Food Insecurity    Worried About Running Out of Food in the Last Year: Never true    Ran Out of Food in the Last Year: Never true   Transportation Needs: No Transportation Needs    Lack of Transportation (Medical): No    Lack of Transportation (Non-Medical): No   Physical Activity: Insufficiently Active    Days of Exercise per Week: 5 days    Minutes of Exercise per Session: 20 min   Stress: No Stress Concern Present    Feeling of Stress : Not at all   Social Connections: Unknown    Frequency of Communication with Friends and Family: More than three times a week    Frequency of Social Gatherings with Friends and Family: More than three times a week    Attends Restoration Services: Never    Active Member of Clubs or Organizations: No    Attends Club or Organization Meetings: Never   Housing Stability: High Risk    Unable to Pay for Housing in the Last Year: Yes    Number of Places Lived in the Last Year: 1    Unstable Housing in the Last Year: No       Precautions:     Allergies as of 02/15/2023 - Reviewed  02/15/2023   Allergen Reaction Noted    Codeine Rash 12/06/2022       WOC Assessment Details/Treatment            02/17/23 1152   WOCN Assessment   WOCN Total Time (mins) 30   Visit Date 02/17/23   Visit Time 1125   Consult Type Follow Up   WOCN Speciality Wound   Wound surgical;pressure   Number of Wounds 4   Intervention assessed;changed;applied        Altered Skin Integrity Left distal;lateral Greater trochanter #1 Other (comment)    No Date First Assessed or Time First Assessed found.   Side: Left  Orientation: distal;lateral  Location: Greater trochanter  Wound Number: #1  Primary Wound Type: (c) Other (comment)  Description of Altered Skin Integrity: (c)    Wound Image    Dressing Appearance Dry;Intact   Drainage Amount None   Appearance Red;Moist   Tissue loss description Full thickness   Red (%), Wound Tissue Color 100 %   Periwound Area Intact   Wound Edges Defined   Wound Length (cm) 2.5 cm   Wound Width (cm) 1.4 cm   Wound Depth (cm) 5 cm   Wound Volume (cm^3) 17.5 cm^3   Wound Surface Area (cm^2) 3.5 cm^2   Care Cleansed with:;Sterile normal saline   Packing packed with;hydrofiber/alginate;other (see comment)  (covered with foam)   Dressing Change Due 02/22/23        Altered Skin Integrity Left lateral;proximal Greater trochanter #2 Other (comment)    No Date First Assessed or Time First Assessed found.   Side: Left  Orientation: lateral;proximal  Location: Greater trochanter  Wound Number: #2  Primary Wound Type: (c) Other (comment)  Description of Altered Skin Integrity: (c)    Wound Image    Dressing Appearance Dry;Intact   Drainage Amount None   Appearance Red;Yellow;Moist   Tissue loss description Full thickness   Red (%), Wound Tissue Color 95 %   Yellow (%), Wound Tissue Color 5 %   Wound Length (cm) 3.5 cm   Wound Width (cm) 0.9 cm   Wound Depth (cm) 3 cm   Wound Volume (cm^3) 9.45 cm^3   Wound Surface Area (cm^2) 3.15 cm^2   Undermining (depth (cm)/location) 1.7 cm at 6 o'clock, 2.1 cm at 7-8  o'clock, 1 cm at 9 o'clock, 0.8 cm at 10-11 o;clock   Care Cleansed with:;Sterile normal saline   Packing packed with;hydrofiber/alginate;other (see comment)  (covered with foam)   Dressing Change Due 02/22/23        Altered Skin Integrity 02/13/23 1945 Coccyx #3 Other (comment) Full thickness tissue loss. Base is covered by slough and/or eschar in the wound bed   Date First Assessed/Time First Assessed: 02/13/23 1945   Altered Skin Integrity Present on Admission: yes  Location: Coccyx  Wound Number: #3  Is this injury device related?: No  Primary Wound Type: (c) Other (comment)  Description of Altered Skin Int...   Dressing Appearance Dry;Intact;Clean   Dressing Foam   Dressing Change Due 02/18/23        Drain/Device  12/09/22 0800 Left hip other (see comments)   Placement Date/Time: 12/09/22 0800   Present Prior to Hospital Arrival?: Yes  Side: Left  Location: hip  Type: (c) other (see comments)   Insertion Site dressing changed;clean and dry  (cleansed site with NS covered with gauze and tape)   Drainage Characteristics/Odor Serous   Drainage Amount Small     Dr. Sahu was present in room during dressing change.  New orders noted to left hip distal and left hip proximal wound care treatment plan.  MD stated to keep MAURICIO drain in place.  New orders noted to change dry dressing to MAURICIO drain site prn rather than daily.  Orders placed.      02/17/2023

## 2023-02-17 NOTE — HOSPITAL COURSE
02/16/2023  Increased oxygen support overnight along with agitation      Today: Patient seen and examined at bedside, and chart reviewed.  02/17 patient is admitted to [facility] hospice for acute respiratory failure and terminal agitation she is on morphine drip comfortable we are going to DC all her p.o. meds as she is not really arousable enough to take them  is at the bedside and understands her current poor prognosis and agrees with hospice care

## 2023-02-17 NOTE — SUBJECTIVE & OBJECTIVE
Interval History:      Review of Systems   Unable to perform ROS: Mental status change   Objective:     Vital Signs (Most Recent):  Temp: 97.5 °F (36.4 °C) (02/17/23 1101)  Pulse: 80 (02/17/23 1101)  Resp: 16 (02/17/23 1101)  BP: (!) 67/29 (02/17/23 1101)  SpO2: (!) 84 % (02/17/23 1101)   Vital Signs (24h Range):  Temp:  [97.2 °F (36.2 °C)-99.9 °F (37.7 °C)] 97.5 °F (36.4 °C)  Pulse:  [80-97] 80  Resp:  [16-28] 16  SpO2:  [82 %-90 %] 84 %  BP: ()/(29-68) 67/29     Weight: 99.8 kg (220 lb)  Body mass index is 37.76 kg/m².    Intake/Output Summary (Last 24 hours) at 2/17/2023 1332  Last data filed at 2/17/2023 0508  Gross per 24 hour   Intake 14.2 ml   Output 105 ml   Net -90.8 ml      Physical Exam  Constitutional:       General: She is not in acute distress.     Appearance: Normal appearance. She is normal weight. She is not ill-appearing.   HENT:      Head: Normocephalic and atraumatic.   Cardiovascular:      Rate and Rhythm: Normal rate and regular rhythm.      Heart sounds: Normal heart sounds.   Pulmonary:      Effort: Pulmonary effort is normal.      Breath sounds: Normal breath sounds.      Comments: Breathing is shallow and unlabored with occasional pauses and apnea spells  Abdominal:      Palpations: Abdomen is soft.      Tenderness: There is no guarding.   Skin:     General: Skin is warm and dry.      Findings: No lesion or rash.   Neurological:      Mental Status: She is alert.      Comments: Patient unresponsive on morphine drip         Significant Labs: All pertinent labs within the past 24 hours have been reviewed.  BMP: No results for input(s): GLU, NA, K, CL, CO2, BUN, CREATININE, CALCIUM, MG in the last 48 hours.  CBC: No results for input(s): WBC, HGB, HCT, PLT in the last 48 hours.    Significant Imaging: I have reviewed all pertinent imaging results/findings within the past 24 hours.

## 2023-02-17 NOTE — PROGRESS NOTES
[facility]-Med/Surg  [facility] Medicine  Progress Note    Patient Name: Shakira Shaw  MRN: [id number]  Patient Class: IP- Inpatient   Admission Date: 2/16/2023  Length of Stay: 1 days  Attending Physician: Juice Rodriguez MD  Primary Care Provider: Rafi Baker MD        Subjective:     Principal Problem:<principal problem not specified>        HPI:  87 y.o. female admitted for acute respiratory failure and terminal agitation to [facility].         Overview/Hospital Course:  02/16/2023  Increased oxygen support overnight along with agitation      Today: Patient seen and examined at bedside, and chart reviewed.  02/17 patient is admitted to [facility] hospice for acute respiratory failure and terminal agitation she is on morphine drip comfortable we are going to DC all her p.o. meds as she is not really arousable enough to take them  is at the bedside and understands her current poor prognosis and agrees with hospice care      Interval History:      Review of Systems   Unable to perform ROS: Mental status change   Objective:     Vital Signs (Most Recent):  Temp: 97.5 °F (36.4 °C) (02/17/23 1101)  Pulse: 80 (02/17/23 1101)  Resp: 16 (02/17/23 1101)  BP: (!) 67/29 (02/17/23 1101)  SpO2: (!) 84 % (02/17/23 1101)   Vital Signs (24h Range):  Temp:  [97.2 °F (36.2 °C)-99.9 °F (37.7 °C)] 97.5 °F (36.4 °C)  Pulse:  [80-97] 80  Resp:  [16-28] 16  SpO2:  [82 %-90 %] 84 %  BP: ()/(29-68) 67/29     Weight: 99.8 kg (220 lb)  Body mass index is 37.76 kg/m².    Intake/Output Summary (Last 24 hours) at 2/17/2023 1332  Last data filed at 2/17/2023 0508  Gross per 24 hour   Intake 14.2 ml   Output 105 ml   Net -90.8 ml      Physical Exam  Constitutional:       General: She is not in acute distress.     Appearance: Normal appearance. She is normal weight. She is not ill-appearing.   HENT:      Head: Normocephalic and atraumatic.   Cardiovascular:      Rate and Rhythm: Normal rate and regular rhythm.      Heart  sounds: Normal heart sounds.   Pulmonary:      Effort: Pulmonary effort is normal.      Breath sounds: Normal breath sounds.      Comments: Breathing is shallow and unlabored with occasional pauses and apnea spells  Abdominal:      Palpations: Abdomen is soft.      Tenderness: There is no guarding.   Skin:     General: Skin is warm and dry.      Findings: No lesion or rash.   Neurological:      Mental Status: She is alert.      Comments: Patient unresponsive on morphine drip         Significant Labs: All pertinent labs within the past 24 hours have been reviewed.  BMP: No results for input(s): GLU, NA, K, CL, CO2, BUN, CREATININE, CALCIUM, MG in the last 48 hours.  CBC: No results for input(s): WBC, HGB, HCT, PLT in the last 48 hours.    Significant Imaging: I have reviewed all pertinent imaging results/findings within the past 24 hours.         Assessment/Plan:      No notes have been filed under this hospital service.  Service: Hospital Medicine    VTE Risk Mitigation (From admission, onward)         Ordered     IP VTE HIGH RISK PATIENT  Once         02/15/23 1830     Place sequential compression device  Until discontinued         02/15/23 1830              DX: End stage CHF  Acute respiratory failure  End stage disease   Continue GIP hospice care and morphine drip    Discharge Planning   SAMREEN:      Code Status: DNR   Is the patient medically ready for discharge?:     Reason for patient still in hospital (select all that apply): Treatment, Pending disposition and Other (specify) hospice/comfort care                     Jada Sahu MD  Department of Hospital Medicine   [facility]/Surg

## 2023-02-18 NOTE — NURSING
Karen Ruben, LPN with [facility] present along with patient's spouse.

## 2023-02-18 NOTE — PROVIDER PROGRESS NOTES - EMERGENCY DEPT.
Encounter Date: 2/15/2023    ED Physician Progress Notes        Physician Note:   Pt was DNR and she  at 9:40 p.m. please release the body to the family's choice of  home

## 2023-02-18 NOTE — NURSING
[facility] notified of patient's change in status. Pulse Absent, Respirations Ceased.

## 2023-02-20 NOTE — PHYSICIAN QUERY
PT Name: Shakira Shaw  MR #: [id number]     Documentation Clarification      CDS: Hortensia Miller RN, CCDS   Contact Information: [email or web address]  This form is a permanent document in the medical record.     Query Date: February 20, 2023    By submitting this query, we are merely seeking further clarification of documentation.  Please utilize your independent clinical judgment when addressing the question(s) below.     The medical record contains the following:  Indicators Supporting Clinical Findings Location in Medical Record   x Chronic Illness Nursing home resident,underlying history of chronic congestive heart failure, hypertension, diabetes mellitus, chronic renal disease, morbid obesity, bedridden, underlying dementia, Bedridden,recent left hip fracture, Left hip, sacrococcyx decubitus ulcer 2/14 Dr. Sujey Muhammad    Total Care or Max Assist     x Immobility or Debility Functional Screen   Ambulation  4 - Completely Dependent    Transferring  4 - Completely Dependent   Toileting  4 - Completely Dependent   Bathing  4 - Completely Dependent   Dressing  4 - Completely Dependent   Eating  3 - Assistive Equipment and person   Communication  0 - Understands/Communicates without difficulty   Swallowing  0 - Swallows Foods/Liquids without difficulty     Dain Risk Assessment   Sensory Perception 3-->slightly limited   Activity 1-->bedfast   Mobility 2-->very limited    2/14 0702 RN Assessment                                         2/13 2053 RN Assessment   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)     x Treatment Turn patient every 2 hours    Skin Interventions   Devise Skin Pressure Protection Absorbent pad utilized/changed; positioning supports utilized; pressure points protected   Pressure Reduction Devices  Pressure-redistributing mattress utilized; positioning supports utilized   Pressure Reduction Techniques  Positioned off of wounds; pressure points protected; weight shift assistance provided   Skin Protection Drying agents applied; incontinence pads utilized; tubing/devices free from skin contact     Orders    2/14 2000 RN Assessment    Other         Provider, please specify the diagnosis or diagnoses associated with above clinical findings:         [  x ] 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    Form No. [id number]